# Patient Record
Sex: FEMALE | Race: WHITE | NOT HISPANIC OR LATINO | Employment: OTHER | URBAN - METROPOLITAN AREA
[De-identification: names, ages, dates, MRNs, and addresses within clinical notes are randomized per-mention and may not be internally consistent; named-entity substitution may affect disease eponyms.]

---

## 2017-03-06 DIAGNOSIS — E87.6 HYPOKALEMIA: ICD-10-CM

## 2017-03-06 RX ORDER — POTASSIUM CHLORIDE 750 MG/1
TABLET, FILM COATED, EXTENDED RELEASE ORAL
OUTPATIENT
Start: 2017-03-06

## 2017-03-06 RX ORDER — POTASSIUM CHLORIDE 20 MEQ/1
20 TABLET, EXTENDED RELEASE ORAL DAILY
Qty: 30 TAB | Refills: 0 | Status: SHIPPED | OUTPATIENT
Start: 2017-03-06 | End: 2017-11-20

## 2017-03-13 RX ORDER — ATENOLOL 50 MG/1
TABLET ORAL
Qty: 90 TAB | Refills: 0 | Status: SHIPPED | OUTPATIENT
Start: 2017-03-13 | End: 2017-06-11 | Stop reason: SDUPTHER

## 2017-03-20 RX ORDER — SPIRONOLACTONE 25 MG/1
TABLET ORAL
Qty: 90 TAB | Refills: 0 | Status: SHIPPED | OUTPATIENT
Start: 2017-03-20 | End: 2017-06-18 | Stop reason: SDUPTHER

## 2017-05-02 RX ORDER — HYDROCHLOROTHIAZIDE 25 MG/1
25 TABLET ORAL DAILY
Qty: 90 TAB | Refills: 1 | Status: SHIPPED | OUTPATIENT
Start: 2017-05-02 | End: 2017-10-07 | Stop reason: SDUPTHER

## 2017-06-08 RX ORDER — LISINOPRIL 10 MG/1
TABLET ORAL
Qty: 90 TAB | Refills: 1 | Status: SHIPPED | OUTPATIENT
Start: 2017-06-08 | End: 2017-11-20

## 2017-06-12 RX ORDER — ATENOLOL 50 MG/1
TABLET ORAL
Qty: 90 TAB | Refills: 1 | Status: SHIPPED | OUTPATIENT
Start: 2017-06-12 | End: 2017-12-08 | Stop reason: SDUPTHER

## 2017-06-19 RX ORDER — SPIRONOLACTONE 25 MG/1
TABLET ORAL
Qty: 90 TAB | Refills: 0 | Status: SHIPPED | OUTPATIENT
Start: 2017-06-19 | End: 2017-09-17 | Stop reason: SDUPTHER

## 2017-09-18 RX ORDER — SPIRONOLACTONE 25 MG/1
TABLET ORAL
Qty: 90 TAB | Refills: 0 | Status: SHIPPED | OUTPATIENT
Start: 2017-09-18 | End: 2017-11-20

## 2017-10-09 RX ORDER — HYDROCHLOROTHIAZIDE 25 MG/1
25 TABLET ORAL DAILY
Qty: 90 TAB | Refills: 1 | Status: SHIPPED | OUTPATIENT
Start: 2017-10-09 | End: 2018-04-09 | Stop reason: SDUPTHER

## 2017-11-02 ENCOUNTER — PATIENT OUTREACH (OUTPATIENT)
Dept: HEALTH INFORMATION MANAGEMENT | Facility: OTHER | Age: 82
End: 2017-11-02

## 2017-11-02 ENCOUNTER — TELEPHONE (OUTPATIENT)
Dept: HEALTH INFORMATION MANAGEMENT | Facility: OTHER | Age: 82
End: 2017-11-02

## 2017-11-02 DIAGNOSIS — Z12.11 SCREENING FOR COLON CANCER: Primary | ICD-10-CM

## 2017-11-02 NOTE — PROGRESS NOTES
Attempt #:1    WebIZ Checked & Epic Updated: Yes  · WebIZ Recommendations: FLU, PREVNAR (PCV13) , TDAP and ZOSTAVAX (Shingles)  · Is patient due for Tdap? YES. Patient was not notified of copay/out of pocket cost.  · Is patient due for Shingles? YES. Patient was not notified of copay/out of pocket cost.  HealthConnect Verified: yes  Verify PCP: yes    Communication Preference Obtained: yes     Review Care Team: yes    Annual Wellness Visit Scheduling  1. Scheduling Status:Scheduled        Care Gap Scheduling (Attempt to Schedule EACH Overdue Care Gap!)     Health Maintenance Due   Topic Date Due   • IMM DTaP/Tdap/Td Vaccine (1 - Tdap) 12[birth year]18/1953   • PAP SMEAR  12/18/1955   • COLONOSCOPY  12/18/1984   • IMM ZOSTER VACCINE  12/18/1994   • Annual Wellness Visit  10/23/2015   • MAMMOGRAM  12/02/2015   • IMM PNEUMOCOCCAL 65+ (ADULT) LOW/MEDIUM RISK SERIES (2 of 2 - PPSV23) 11/11/2016   • IMM INFLUENZA (1) 09/01/2017        Scheduled patient for Annual Wellness Visit will dicuss all other care gaps with pcp       MyChart Activation: already active  AgFlow Unique: no  Virtual Visits: no  Opt In to Text Messages: no

## 2017-11-14 ENCOUNTER — TELEPHONE (OUTPATIENT)
Dept: MEDICAL GROUP | Facility: MEDICAL CENTER | Age: 82
End: 2017-11-14

## 2017-11-14 NOTE — TELEPHONE ENCOUNTER
Future Appointments       Provider Department Center    11/20/2017 10:20 AM Radha Underwood M.D.; [facility] [address] [facility]          ANNUAL WELLNESS VISIT PRE-VISIT PLANNING     1.  Reviewed note from last office visit with PCP: YES Visit date: 10/04/16    2.  If any orders were placed at last visit, do we have Results/Consult Notes?        •  Labs - Labs ordered, NOT completed. Patient advised to complete prior to next appointment. REMIND PT TO COMPLETE FIT TEST       •  Imaging - Imaging ordered, completed and results are in chart. 10/11/16 DX SHOULDER       •  Referrals - No referrals were ordered at last office visit.     3.  Immunizations were updated in [facility] using WebIZ?: Yes       •  WebIZ Recommendations: FLU, PNEUMOVAX (PPSV23), TDAP and ZOSTAVAX (Shingles)       •  Is patient due for Tdap? YES. Patient was not notified of copay/out of pocket cost.       •  Is patient due for Shingles? YES. Patient was not notified of copay/out of pocket cost.     4.  Patient is due for the following Health Maintenance Topics:   Health Maintenance Due   Topic Date Due   •  Annual Wellness Visit  SCHEDULED FOR 11/20/17   • IMM DTaP/Tdap/Td Vaccine (1 - Tdap) NEEDS SCRIPT   • IMM ZOSTER VACCINE  NEEDS SCRIPT   • IMM PNEUMOVAX 23 11/11/2016   • IMM INFLUENZA (1) 09/01/2017

## 2017-11-20 ENCOUNTER — OFFICE VISIT (OUTPATIENT)
Dept: MEDICAL GROUP | Facility: MEDICAL CENTER | Age: 82
End: 2017-11-20
Payer: MEDICARE

## 2017-11-20 VITALS
HEIGHT: 63 IN | WEIGHT: 131 LBS | HEART RATE: 73 BPM | OXYGEN SATURATION: 95 % | SYSTOLIC BLOOD PRESSURE: 124 MMHG | TEMPERATURE: 98.5 F | BODY MASS INDEX: 23.21 KG/M2 | DIASTOLIC BLOOD PRESSURE: 68 MMHG

## 2017-11-20 DIAGNOSIS — Z23 NEEDS FLU SHOT: ICD-10-CM

## 2017-11-20 DIAGNOSIS — F32.0 MILD SINGLE CURRENT EPISODE OF MAJOR DEPRESSIVE DISORDER (HCC): ICD-10-CM

## 2017-11-20 DIAGNOSIS — Z12.11 COLON CANCER SCREENING: ICD-10-CM

## 2017-11-20 DIAGNOSIS — I10 ESSENTIAL HYPERTENSION: Chronic | ICD-10-CM

## 2017-11-20 DIAGNOSIS — Z00.00 MEDICARE ANNUAL WELLNESS VISIT, SUBSEQUENT: Primary | ICD-10-CM

## 2017-11-20 DIAGNOSIS — Z23 NEED FOR PNEUMOCOCCAL VACCINE: ICD-10-CM

## 2017-11-20 PROBLEM — F32.9 MAJOR DEPRESSIVE DISORDER: Status: ACTIVE | Noted: 2017-11-20

## 2017-11-20 PROCEDURE — 90662 IIV NO PRSV INCREASED AG IM: CPT | Performed by: FAMILY MEDICINE

## 2017-11-20 PROCEDURE — G0009 ADMIN PNEUMOCOCCAL VACCINE: HCPCS | Performed by: FAMILY MEDICINE

## 2017-11-20 PROCEDURE — G0008 ADMIN INFLUENZA VIRUS VAC: HCPCS | Performed by: FAMILY MEDICINE

## 2017-11-20 PROCEDURE — 90732 PPSV23 VACC 2 YRS+ SUBQ/IM: CPT | Performed by: FAMILY MEDICINE

## 2017-11-20 PROCEDURE — G0439 PPPS, SUBSEQ VISIT: HCPCS | Mod: 25 | Performed by: FAMILY MEDICINE

## 2017-11-20 ASSESSMENT — PATIENT HEALTH QUESTIONNAIRE - PHQ9
SUM OF ALL RESPONSES TO PHQ QUESTIONS 1-9: 10
CLINICAL INTERPRETATION OF PHQ2 SCORE: 3
5. POOR APPETITE OR OVEREATING: 3 - NEARLY EVERY DAY

## 2017-11-20 ASSESSMENT — PAIN SCALES - GENERAL: PAINLEVEL: 7=MODERATE-SEVERE PAIN

## 2017-11-20 NOTE — PROGRESS NOTES
Chief Complaint   Patient presents with   • Annual Wellness Visit         HPI:  Priscila is a 82 y.o. here for Medicare Annual Wellness Visit    Essential hypertension, BP has been adequately controlled on current medication. Denies headache, chest pain, and SOB.She has been  on HCTZ 25 mg daily, and Atenolol 50 mg daily. Denies side effects. .    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 However patient stated the she has been living with it, denies any suicidal ideation, refused a referral to behavioral health and antidepressant medication.    Due for the flu , and pneumonia shots, and colon cancer screening      Patient Active Problem List    Diagnosis Date Noted   • Chronic back pain 03/17/2016   • Primary osteoarthritis involving multiple joints 12/14/2015   • Family history of breast cancer in sister 11/25/2014   • Hypertension 10/22/2014       Current Outpatient Prescriptions   Medication Sig Dispense Refill   • Diphenhydramine-APAP, sleep, (TYLENOL PM EXTRA STRENGTH)  MG Tab Take  by mouth.     • hydrochlorothiazide (HYDRODIURIL) 25 MG Tab Take 1 Tab by mouth every day. 90 Tab 1   • atenolol (TENORMIN) 50 MG Tab TAKE 1 TABLET DAILY 90 Tab 1   • aspirin 81 MG tablet Take 81 mg by mouth every day.     • spironolactone (ALDACTONE) 25 MG Tab TAKE 1 TABLET DAILY (Patient not taking: Reported on 11/20/2017) 90 Tab 0   • lisinopril (PRINIVIL) 10 MG Tab TAKE 1 TABLET DAILY (Patient not taking: Reported on 11/20/2017) 90 Tab 1   • potassium chloride SA (KDUR) 20 MEQ Tab CR Take 1 Tab by mouth every day. 30 Tab 0   • KLOR-CON 10 10 MEQ tablet TAKE 1 TABLET DAILY 90 Tab 0   • methylPREDNISolone acetate (DEPO-MEDROL) 80 MG/ML Suspension 80 mg by Intramuscular route Once.       No current facility-administered medications for this visit.         Patient is taking medications as noted in medication  list.  Current supplements as per medication list.     Allergies: Patient has no known allergies.    Current social contact/activities: Lives with son, visits with family     Is patient current with immunizations? No, due for FLU, PNEUMOVAX (PPSV23), TDAP and ZOSTAVAX (Shingles). Patient is interested in receiving FLU and PNEUMOVAX (PPSV23) today.    She  reports that she quit smoking about 56 years ago. Her smoking use included Cigarettes. She has a 2.50 pack-year smoking history. She has never used smokeless tobacco. She reports that she drinks alcohol. She reports that she does not use drugs.  Counseling given: Not Answered        DPA/Advanced directive: Patient has Advanced Directive, but it is not on file. Instructed to bring in a copy to scan into their chart.    ROS:    Gait: Uses no assistive device   Ostomy: no   Other tubes: no   Amputations: no   Chronic oxygen use no   Last eye exam 1 year ago   Wears hearing aids: no   : Reports incontinence.       Screening:    DEPRESSION     Is patient seeing a counselor, psychiatrist or other healthcare provider regarding their mental health? No, but interested in referral. Referral pended.     Depression Screen (PHQ-2/PHQ-9) 11/11/2015 6/30/2016 11/20/2017   PHQ-2 Total Score 2 0 -   PHQ-2 Total Score - - 3   PHQ-9 Total Score - - 10       Interpretation of PHQ-9 Total Score   Score Severity   1-4 No Depression   5-9 Mild Depression   10-14 Moderate Depression   15-19 Moderately Severe Depression   20-27 Severe Depression      Depression Screening    Little interest or pleasure in doing things?  0 - not at all  Feeling down, depressed, or hopeless? 3 - nearly every day  Trouble falling or staying asleep, or sleeping too much?  2 - more than half the days  Feeling tired or having little energy?  1 - several days  Poor appetite or overeating?  3 - nearly every day  Feeling bad about yourself - or that you are a failure or have let yourself or your family down? 0 -  not at all  Trouble concentrating on things, such as reading the newspaper or watching television? 0 - not at all  Moving or speaking so slowly that other people could have noticed.  Or the opposite - being so fidgety or restless that you have been moving around a lot more than usual?  0 - not at all  Thoughts that you would be better off dead, or of hurting yourself?  1 - several days  Patient Health Questionnaire Score: 10      If depressive symptoms identified deferred to follow up visit unless specifically addressed in assessment and plan.    Interpretation of PHQ-9 Total Score   Score Severity   1-4 No Depression   5-9 Mild Depression   10-14 Moderate Depression   15-19 Moderately Severe Depression   20-27 Severe Depression      Screening for Cognitive Impairment    Three Minute Recall (apple, watch, megan)  1/3    Draw clock face with all 12 numbers set to the hand to show 10 minutes past 11 o'clock  1 5/5  If cognitive concerns identified, deferred for follow up unless specifically addressed in assessment and plan.    Fall Risk Assessment    Has the patient had two or more falls in the last year or any fall with injury in the last year?  No  If fall risk identified, deferred for follow up unless specifically addressed in assessment and plan.      Safety Assessment    Throw rugs on floor.  No  Handrails on all stairs.  Yes  Good lighting in all hallways.  Yes  Difficulty hearing.  Yes  Patient counseled about all safety risks that were identified.    Functional Assessment ADLs    Are there any barriers preventing you from cooking for yourself or meeting nutritional needs?  No.    Are there any barriers preventing you from driving safely or obtaining transportation?  No.    Are there any barriers preventing you from using a telephone or calling for help?  No.    Are there any barriers preventing you from shopping?  No.    Are there any barriers preventing you from taking care of your own finances?  No.    Are  there any barriers preventing you from managing your medications?  No.    Are you currently engaging any exercise or physical activity?  Yes.       Health Maintenance Summary                IMM DTaP/Tdap/Td Vaccine Overdue 12/18/1953     IMM ZOSTER VACCINE Overdue 12/18/1994     Annual Wellness Visit Overdue 10/23/2015      Done 10/22/2014     IMM PNEUMOCOCCAL 65+ (ADULT) LOW/MEDIUM RISK SERIES Overdue 11/11/2016      Done 11/11/2015 Imm Admin: Pneumococcal Conjugate Vaccine (Prevnar/PCV-13)    IMM INFLUENZA Overdue 9/1/2017      Done 11/11/2015 Imm Admin: Influenza Vaccine Quad Inj (Preserved)     Patient has more history with this topic...    BONE DENSITY Next Due 12/11/2019      Done 12/11/2014 DS-BONE DENSITY STUDY (DEXA)     Patient has more history with this topic...          Patient Care Team:  Radha Underwood M.D. as PCP - General (Geriatrics)  Mario Frederick M.D. as Consulting Physician (Cardiology)      Social History   Substance Use Topics   • Smoking status: Former Smoker     Packs/day: 0.50     Years: 5.00     Types: Cigarettes     Quit date: 6/4/1961   • Smokeless tobacco: Never Used      Comment: stated smoking at age 22   • Alcohol use Yes      Comment: occasional     Family History   Problem Relation Age of Onset   • Cancer Mother      uterine cancer   • Cancer Sister      breast cancer   • No Known Problems Father    • No Known Problems Maternal Grandmother    • No Known Problems Maternal Grandfather    • No Known Problems Paternal Grandmother    • No Known Problems Paternal Grandfather    • Genetic Brother      COPD     She  has a past medical history of Hypertension and Post-menopause (10/22/2014). She also has no past medical history of Asthma or Type II or unspecified type diabetes mellitus without mention of complication, not stated as uncontrolled.   Past Surgical History:   Procedure Laterality Date   • ABDOMINAL HYSTERECTOMY TOTAL     • OPEN REDUCTION      ankle x2         Exam:  "    Blood pressure 124/68, pulse 73, temperature 36.9 °C (98.5 °F), height 1.6 m (5' 3\"), weight 59.4 kg (131 lb), SpO2 95 %. Body mass index is 23.21 kg/m².    Hearing, fine.  Dentition , good.  Alert, oriented in no acute distress.  Eye contact is good, speech goal directed, affect calm      Assessment and Plan. The following treatment and monitoring plan is recommended:    82 y.o. female     1. Needs flu shot  Given today.    - INFLUENZA VACCINE, HIGH DOSE (65+ ONLY)  - Annual Wellness Visit - Includes PPPS Subsequent ()    2. Need for pneumococcal vaccine  Given today.    - PNEUMOCOCCAL POLYSACCHARIDE VACCINE 23-VALENT =>1YO SQ/IM; Future  - Annual Wellness Visit - Includes PPPS Subsequent ()    3. Essential hypertension  Has been adequately controlled on current medication. Denies headache, chest pain, and SOB.  Continue on HCTZ, and Atenolol.    - Annual Wellness Visit - Includes PPPS Subsequent ()    4. 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 However patient stated the she has been living with it, denies any suicidal ideation, refused a referral to behavioral health and antidepressant medication.    - Annual Wellness Visit - Includes PPPS Subsequent ()    5. Medicare annual wellness visit, subsequent  Preventive measures and chronic medical issues were reveiwed.    - Patient has been identified as being depressed and appropriate orders and counseling have been given  - Annual Wellness Visit - Includes PPPS Subsequent ()    6. Colon cancer screening    - Annual Wellness Visit - Includes PPPS Subsequent ()  - OCCULT BLOOD FECES IMMUNOASSAY; Future      Health Care Screening recommendations as per orders if indicated.  Referrals offered: PT/OT/Nutrition counseling/Behavioral Health/Smoking cessation as per orders if indicated.    Discussion today about general wellness and lifestyle habits:    · Prevent falls and reduce trip hazards; " Cautioned about securing or removing rugs.  · Have a working fire alarm and carbon monoxide detector;   · Engage in regular physical activity and social activities       Follow-up: 3 month.

## 2017-11-22 ENCOUNTER — HOSPITAL ENCOUNTER (OUTPATIENT)
Facility: MEDICAL CENTER | Age: 82
End: 2017-11-22
Attending: FAMILY MEDICINE
Payer: MEDICARE

## 2017-11-22 PROCEDURE — 82274 ASSAY TEST FOR BLOOD FECAL: CPT

## 2017-12-05 DIAGNOSIS — Z12.11 SCREENING FOR COLON CANCER: ICD-10-CM

## 2017-12-05 LAB — HEMOCCULT STL QL IA: NEGATIVE

## 2017-12-12 RX ORDER — ATENOLOL 50 MG/1
TABLET ORAL
Qty: 90 TAB | Refills: 1 | Status: SHIPPED | OUTPATIENT
Start: 2017-12-12 | End: 2018-05-25 | Stop reason: SDUPTHER

## 2018-01-11 ENCOUNTER — TELEPHONE (OUTPATIENT)
Dept: MEDICAL GROUP | Facility: MEDICAL CENTER | Age: 83
End: 2018-01-11

## 2018-01-11 DIAGNOSIS — I10 ESSENTIAL HYPERTENSION: Chronic | ICD-10-CM

## 2018-01-11 NOTE — TELEPHONE ENCOUNTER
Patient's daughter called stating she has concerns regarding Priscila. She has been only getting 34 minutes of sleep per night for quite some time. Also she is experiencing chest pain when she lays back. She states she informed you about this when she was seen on 11/20/17. She states the pain goes through her back, up her left arm and into her fingers. She states she take extra strength tylenol for the pain and waits 45 minutes for it to kick in. She also explained how she talked about this in detail with Xenia and may have forgotten to explain this to you. She would like you to order labs to have drawn for potassium and anything else you feel necessary. Please Advise, I iInformed the patient I would call her back by the end of day. Thank you.

## 2018-01-12 NOTE — TELEPHONE ENCOUNTER
Patient was called, she has appointment in 2/15, advised to make it earlier to discuss all her concerns. All blood work orders placed.

## 2018-01-13 ENCOUNTER — HOSPITAL ENCOUNTER (OUTPATIENT)
Dept: LAB | Facility: MEDICAL CENTER | Age: 83
End: 2018-01-13
Attending: FAMILY MEDICINE
Payer: MEDICARE

## 2018-01-13 DIAGNOSIS — I10 ESSENTIAL HYPERTENSION: Chronic | ICD-10-CM

## 2018-01-13 LAB
ALBUMIN SERPL BCP-MCNC: 4.4 G/DL (ref 3.2–4.9)
ALBUMIN/GLOB SERPL: 1.3 G/DL
ALP SERPL-CCNC: 66 U/L (ref 30–99)
ALT SERPL-CCNC: 15 U/L (ref 2–50)
ANION GAP SERPL CALC-SCNC: 10 MMOL/L (ref 0–11.9)
AST SERPL-CCNC: 18 U/L (ref 12–45)
BASOPHILS # BLD AUTO: 0.9 % (ref 0–1.8)
BASOPHILS # BLD: 0.06 K/UL (ref 0–0.12)
BILIRUB SERPL-MCNC: 0.6 MG/DL (ref 0.1–1.5)
BUN SERPL-MCNC: 19 MG/DL (ref 8–22)
CALCIUM SERPL-MCNC: 9.2 MG/DL (ref 8.5–10.5)
CHLORIDE SERPL-SCNC: 99 MMOL/L (ref 96–112)
CHOLEST SERPL-MCNC: 211 MG/DL (ref 100–199)
CO2 SERPL-SCNC: 29 MMOL/L (ref 20–33)
CREAT SERPL-MCNC: 0.65 MG/DL (ref 0.5–1.4)
EOSINOPHIL # BLD AUTO: 0.25 K/UL (ref 0–0.51)
EOSINOPHIL NFR BLD: 4 % (ref 0–6.9)
ERYTHROCYTE [DISTWIDTH] IN BLOOD BY AUTOMATED COUNT: 47 FL (ref 35.9–50)
GLOBULIN SER CALC-MCNC: 3.5 G/DL (ref 1.9–3.5)
GLUCOSE SERPL-MCNC: 101 MG/DL (ref 65–99)
HCT VFR BLD AUTO: 43.8 % (ref 37–47)
HDLC SERPL-MCNC: 55 MG/DL
HGB BLD-MCNC: 14.4 G/DL (ref 12–16)
IMM GRANULOCYTES # BLD AUTO: 0.01 K/UL (ref 0–0.11)
IMM GRANULOCYTES NFR BLD AUTO: 0.2 % (ref 0–0.9)
LDLC SERPL CALC-MCNC: 121 MG/DL
LYMPHOCYTES # BLD AUTO: 2.3 K/UL (ref 1–4.8)
LYMPHOCYTES NFR BLD: 36.4 % (ref 22–41)
MCH RBC QN AUTO: 30.6 PG (ref 27–33)
MCHC RBC AUTO-ENTMCNC: 32.9 G/DL (ref 33.6–35)
MCV RBC AUTO: 93 FL (ref 81.4–97.8)
MONOCYTES # BLD AUTO: 0.44 K/UL (ref 0–0.85)
MONOCYTES NFR BLD AUTO: 7 % (ref 0–13.4)
NEUTROPHILS # BLD AUTO: 3.26 K/UL (ref 2–7.15)
NEUTROPHILS NFR BLD: 51.5 % (ref 44–72)
NRBC # BLD AUTO: 0 K/UL
NRBC BLD-RTO: 0 /100 WBC
PLATELET # BLD AUTO: 224 K/UL (ref 164–446)
PMV BLD AUTO: 11.8 FL (ref 9–12.9)
POTASSIUM SERPL-SCNC: 3.4 MMOL/L (ref 3.6–5.5)
PROT SERPL-MCNC: 7.9 G/DL (ref 6–8.2)
RBC # BLD AUTO: 4.71 M/UL (ref 4.2–5.4)
SODIUM SERPL-SCNC: 138 MMOL/L (ref 135–145)
TRIGL SERPL-MCNC: 175 MG/DL (ref 0–149)
TSH SERPL DL<=0.005 MIU/L-ACNC: 1.83 UIU/ML (ref 0.38–5.33)
WBC # BLD AUTO: 6.3 K/UL (ref 4.8–10.8)

## 2018-01-13 PROCEDURE — 36415 COLL VENOUS BLD VENIPUNCTURE: CPT

## 2018-01-13 PROCEDURE — 80053 COMPREHEN METABOLIC PANEL: CPT

## 2018-01-13 PROCEDURE — 84443 ASSAY THYROID STIM HORMONE: CPT

## 2018-01-13 PROCEDURE — 80061 LIPID PANEL: CPT

## 2018-01-13 PROCEDURE — 85025 COMPLETE CBC W/AUTO DIFF WBC: CPT

## 2018-01-24 ENCOUNTER — PATIENT MESSAGE (OUTPATIENT)
Dept: MEDICAL GROUP | Facility: MEDICAL CENTER | Age: 83
End: 2018-01-24

## 2018-04-10 RX ORDER — HYDROCHLOROTHIAZIDE 25 MG/1
25 TABLET ORAL DAILY
Qty: 90 TAB | Refills: 1 | Status: SHIPPED | OUTPATIENT
Start: 2018-04-10 | End: 2018-04-11 | Stop reason: SDUPTHER

## 2018-04-11 RX ORDER — HYDROCHLOROTHIAZIDE 25 MG/1
25 TABLET ORAL DAILY
Qty: 90 TAB | Refills: 1 | Status: SHIPPED | OUTPATIENT
Start: 2018-04-11 | End: 2018-10-09 | Stop reason: SDUPTHER

## 2018-05-25 RX ORDER — ATENOLOL 50 MG/1
TABLET ORAL
Qty: 90 TAB | Refills: 3 | Status: ON HOLD | OUTPATIENT
Start: 2018-05-25 | End: 2020-11-22

## 2018-10-09 RX ORDER — HYDROCHLOROTHIAZIDE 25 MG/1
25 TABLET ORAL DAILY
Qty: 30 TAB | Refills: 0 | Status: SHIPPED | OUTPATIENT
Start: 2018-10-09 | End: 2018-10-11 | Stop reason: SDUPTHER

## 2018-10-11 RX ORDER — HYDROCHLOROTHIAZIDE 25 MG/1
25 TABLET ORAL DAILY
Qty: 30 TAB | Refills: 0 | Status: CANCELLED | OUTPATIENT
Start: 2018-10-11

## 2018-10-11 RX ORDER — HYDROCHLOROTHIAZIDE 25 MG/1
25 TABLET ORAL DAILY
Qty: 30 TAB | Refills: 0 | Status: SHIPPED | OUTPATIENT
Start: 2018-10-11 | End: 2020-12-19

## 2020-11-19 ENCOUNTER — APPOINTMENT (OUTPATIENT)
Dept: RADIOLOGY | Facility: MEDICAL CENTER | Age: 85
DRG: 024 | End: 2020-11-19
Attending: RADIOLOGY
Payer: MEDICARE

## 2020-11-19 ENCOUNTER — HOSPITAL ENCOUNTER (OUTPATIENT)
Dept: RADIOLOGY | Facility: MEDICAL CENTER | Age: 85
End: 2020-11-19
Payer: MEDICARE

## 2020-11-19 ENCOUNTER — HOSPITAL ENCOUNTER (INPATIENT)
Facility: MEDICAL CENTER | Age: 85
LOS: 3 days | DRG: 024 | End: 2020-11-22
Attending: EMERGENCY MEDICINE | Admitting: HOSPITALIST
Payer: MEDICARE

## 2020-11-19 DIAGNOSIS — I63.9 CEREBROVASCULAR ACCIDENT (CVA), UNSPECIFIED MECHANISM (HCC): ICD-10-CM
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
## 2020-11-19 PROBLEM — R07.9 CHEST PAIN: Status: ACTIVE | Noted: 2020-11-19

## 2020-11-19 LAB
ABO + RH BLD: NORMAL
ABO GROUP BLD: NORMAL
ALBUMIN SERPL BCP-MCNC: 4.1 G/DL (ref 3.2–4.9)
ALBUMIN/GLOB SERPL: 1.3 G/DL
ALP SERPL-CCNC: 102 U/L (ref 30–99)
ALT SERPL-CCNC: 48 U/L (ref 2–50)
ANION GAP SERPL CALC-SCNC: 15 MMOL/L (ref 7–16)
APTT PPP: 35.1 SEC (ref 24.7–36)
AST SERPL-CCNC: 54 U/L (ref 12–45)
BASOPHILS # BLD AUTO: 0.3 % (ref 0–1.8)
BASOPHILS # BLD: 0.05 K/UL (ref 0–0.12)
BILIRUB SERPL-MCNC: 0.5 MG/DL (ref 0.1–1.5)
BLD GP AB SCN SERPL QL: NORMAL
BUN SERPL-MCNC: 20 MG/DL (ref 8–22)
CALCIUM SERPL-MCNC: 9.6 MG/DL (ref 8.5–10.5)
CHLORIDE SERPL-SCNC: 100 MMOL/L (ref 96–112)
CO2 SERPL-SCNC: 24 MMOL/L (ref 20–33)
COVID ORDER STATUS COVID19: NORMAL
CREAT SERPL-MCNC: 0.66 MG/DL (ref 0.5–1.4)
EKG IMPRESSION: NORMAL
EOSINOPHIL # BLD AUTO: 0.09 K/UL (ref 0–0.51)
EOSINOPHIL NFR BLD: 0.6 % (ref 0–6.9)
ERYTHROCYTE [DISTWIDTH] IN BLOOD BY AUTOMATED COUNT: 51.5 FL (ref 35.9–50)
GLOBULIN SER CALC-MCNC: 3.1 G/DL (ref 1.9–3.5)
GLUCOSE SERPL-MCNC: 137 MG/DL (ref 65–99)
HCT VFR BLD AUTO: 43.2 % (ref 37–47)
HGB BLD-MCNC: 14.2 G/DL (ref 12–16)
IMM GRANULOCYTES # BLD AUTO: 0.08 K/UL (ref 0–0.11)
IMM GRANULOCYTES NFR BLD AUTO: 0.5 % (ref 0–0.9)
INR PPP: 1.08 (ref 0.87–1.13)
LYMPHOCYTES # BLD AUTO: 1.48 K/UL (ref 1–4.8)
LYMPHOCYTES NFR BLD: 10 % (ref 22–41)
MAGNESIUM SERPL-MCNC: 1.5 MG/DL (ref 1.5–2.5)
MCH RBC QN AUTO: 31.9 PG (ref 27–33)
MCHC RBC AUTO-ENTMCNC: 32.9 G/DL (ref 33.6–35)
MCV RBC AUTO: 97.1 FL (ref 81.4–97.8)
MONOCYTES # BLD AUTO: 0.52 K/UL (ref 0–0.85)
MONOCYTES NFR BLD AUTO: 3.5 % (ref 0–13.4)
NEUTROPHILS # BLD AUTO: 12.52 K/UL (ref 2–7.15)
NEUTROPHILS NFR BLD: 85.1 % (ref 44–72)
NRBC # BLD AUTO: 0 K/UL
NRBC BLD-RTO: 0 /100 WBC
PLATELET # BLD AUTO: 174 K/UL (ref 164–446)
PMV BLD AUTO: 11.9 FL (ref 9–12.9)
POTASSIUM SERPL-SCNC: 3 MMOL/L (ref 3.6–5.5)
PROT SERPL-MCNC: 7.2 G/DL (ref 6–8.2)
PROTHROMBIN TIME: 14.4 SEC (ref 12–14.6)
RBC # BLD AUTO: 4.45 M/UL (ref 4.2–5.4)
RH BLD: NORMAL
SARS-COV+SARS-COV-2 AG RESP QL IA.RAPID: NOTDETECTED
SODIUM SERPL-SCNC: 139 MMOL/L (ref 135–145)
SPECIMEN SOURCE: NORMAL
TROPONIN T SERPL-MCNC: 14 NG/L (ref 6–19)
TROPONIN T SERPL-MCNC: 18 NG/L (ref 6–19)
WBC # BLD AUTO: 14.7 K/UL (ref 4.8–10.8)

## 2020-11-19 PROCEDURE — 700101 HCHG RX REV CODE 250: Performed by: HOSPITALIST

## 2020-11-19 PROCEDURE — 93010 ELECTROCARDIOGRAM REPORT: CPT | Performed by: INTERNAL MEDICINE

## 2020-11-19 PROCEDURE — 03CG3ZZ EXTIRPATION OF MATTER FROM INTRACRANIAL ARTERY, PERCUTANEOUS APPROACH: ICD-10-PCS | Performed by: RADIOLOGY

## 2020-11-19 PROCEDURE — 99291 CRITICAL CARE FIRST HOUR: CPT

## 2020-11-19 PROCEDURE — 99223 1ST HOSP IP/OBS HIGH 75: CPT | Performed by: PSYCHIATRY & NEUROLOGY

## 2020-11-19 PROCEDURE — 86850 RBC ANTIBODY SCREEN: CPT

## 2020-11-19 PROCEDURE — 85610 PROTHROMBIN TIME: CPT

## 2020-11-19 PROCEDURE — 86900 BLOOD TYPING SEROLOGIC ABO: CPT

## 2020-11-19 PROCEDURE — 93005 ELECTROCARDIOGRAM TRACING: CPT | Performed by: INTERNAL MEDICINE

## 2020-11-19 PROCEDURE — 770022 HCHG ROOM/CARE - ICU (200)

## 2020-11-19 PROCEDURE — 84484 ASSAY OF TROPONIN QUANT: CPT

## 2020-11-19 PROCEDURE — 85025 COMPLETE CBC W/AUTO DIFF WBC: CPT

## 2020-11-19 PROCEDURE — 700111 HCHG RX REV CODE 636 W/ 250 OVERRIDE (IP): Performed by: INTERNAL MEDICINE

## 2020-11-19 PROCEDURE — 85730 THROMBOPLASTIN TIME PARTIAL: CPT

## 2020-11-19 PROCEDURE — 99291 CRITICAL CARE FIRST HOUR: CPT | Performed by: INTERNAL MEDICINE

## 2020-11-19 PROCEDURE — 86901 BLOOD TYPING SEROLOGIC RH(D): CPT

## 2020-11-19 PROCEDURE — 83735 ASSAY OF MAGNESIUM: CPT

## 2020-11-19 PROCEDURE — 80053 COMPREHEN METABOLIC PANEL: CPT

## 2020-11-19 PROCEDURE — 37184 PRIM ART M-THRMBC 1ST VSL: CPT

## 2020-11-19 PROCEDURE — 700117 HCHG RX CONTRAST REV CODE 255: Performed by: RADIOLOGY

## 2020-11-19 PROCEDURE — 99223 1ST HOSP IP/OBS HIGH 75: CPT | Mod: AI | Performed by: HOSPITALIST

## 2020-11-19 PROCEDURE — 87426 SARSCOV CORONAVIRUS AG IA: CPT

## 2020-11-19 RX ORDER — POLYETHYLENE GLYCOL 3350 17 G/17G
1 POWDER, FOR SOLUTION ORAL
Status: CANCELLED | OUTPATIENT
Start: 2020-11-19

## 2020-11-19 RX ORDER — ONDANSETRON 2 MG/ML
4 INJECTION INTRAMUSCULAR; INTRAVENOUS EVERY 4 HOURS PRN
Status: DISCONTINUED | OUTPATIENT
Start: 2020-11-19 | End: 2020-11-22 | Stop reason: HOSPADM

## 2020-11-19 RX ORDER — ONDANSETRON 2 MG/ML
4 INJECTION INTRAMUSCULAR; INTRAVENOUS EVERY 4 HOURS PRN
Status: CANCELLED | OUTPATIENT
Start: 2020-11-19

## 2020-11-19 RX ORDER — ASPIRIN 300 MG/1
300 SUPPOSITORY RECTAL DAILY
Status: DISCONTINUED | OUTPATIENT
Start: 2020-11-20 | End: 2020-11-22 | Stop reason: HOSPADM

## 2020-11-19 RX ORDER — ASPIRIN 325 MG
325 TABLET ORAL DAILY
Status: CANCELLED | OUTPATIENT
Start: 2020-11-20

## 2020-11-19 RX ORDER — POLYETHYLENE GLYCOL 3350 17 G/17G
1 POWDER, FOR SOLUTION ORAL
Status: DISCONTINUED | OUTPATIENT
Start: 2020-11-19 | End: 2020-11-22 | Stop reason: HOSPADM

## 2020-11-19 RX ORDER — SODIUM CHLORIDE 9 MG/ML
INJECTION, SOLUTION INTRAVENOUS CONTINUOUS
Status: CANCELLED | OUTPATIENT
Start: 2020-11-19 | End: 2020-11-20

## 2020-11-19 RX ORDER — ONDANSETRON 4 MG/1
4 TABLET, ORALLY DISINTEGRATING ORAL EVERY 4 HOURS PRN
Status: CANCELLED | OUTPATIENT
Start: 2020-11-19

## 2020-11-19 RX ORDER — AMOXICILLIN 250 MG
2 CAPSULE ORAL 2 TIMES DAILY
Status: CANCELLED | OUTPATIENT
Start: 2020-11-19

## 2020-11-19 RX ORDER — ONDANSETRON 4 MG/1
4 TABLET, ORALLY DISINTEGRATING ORAL EVERY 4 HOURS PRN
Status: DISCONTINUED | OUTPATIENT
Start: 2020-11-19 | End: 2020-11-22 | Stop reason: HOSPADM

## 2020-11-19 RX ORDER — ASPIRIN 300 MG/1
300 SUPPOSITORY RECTAL DAILY
Status: CANCELLED | OUTPATIENT
Start: 2020-11-20

## 2020-11-19 RX ORDER — ASPIRIN 81 MG/1
324 TABLET, CHEWABLE ORAL DAILY
Status: CANCELLED | OUTPATIENT
Start: 2020-11-20

## 2020-11-19 RX ORDER — BISACODYL 10 MG
10 SUPPOSITORY, RECTAL RECTAL
Status: CANCELLED | OUTPATIENT
Start: 2020-11-19

## 2020-11-19 RX ORDER — ASPIRIN 325 MG
325 TABLET ORAL DAILY
Status: DISCONTINUED | OUTPATIENT
Start: 2020-11-20 | End: 2020-11-22 | Stop reason: HOSPADM

## 2020-11-19 RX ORDER — BISACODYL 10 MG
10 SUPPOSITORY, RECTAL RECTAL
Status: DISCONTINUED | OUTPATIENT
Start: 2020-11-19 | End: 2020-11-22 | Stop reason: HOSPADM

## 2020-11-19 RX ORDER — AMOXICILLIN 250 MG
2 CAPSULE ORAL 2 TIMES DAILY
Status: DISCONTINUED | OUTPATIENT
Start: 2020-11-19 | End: 2020-11-22 | Stop reason: HOSPADM

## 2020-11-19 RX ORDER — POTASSIUM CHLORIDE 7.45 MG/ML
10 INJECTION INTRAVENOUS
Status: DISPENSED | OUTPATIENT
Start: 2020-11-19 | End: 2020-11-19

## 2020-11-19 RX ORDER — ACETAMINOPHEN 325 MG/1
650 TABLET ORAL EVERY 6 HOURS PRN
Status: DISCONTINUED | OUTPATIENT
Start: 2020-11-19 | End: 2020-11-22 | Stop reason: HOSPADM

## 2020-11-19 RX ORDER — LABETALOL HYDROCHLORIDE 5 MG/ML
10 INJECTION, SOLUTION INTRAVENOUS
Status: DISCONTINUED | OUTPATIENT
Start: 2020-11-19 | End: 2020-11-22 | Stop reason: HOSPADM

## 2020-11-19 RX ORDER — ATORVASTATIN CALCIUM 40 MG/1
40 TABLET, FILM COATED ORAL EVERY EVENING
Status: DISCONTINUED | OUTPATIENT
Start: 2020-11-19 | End: 2020-11-22 | Stop reason: HOSPADM

## 2020-11-19 RX ORDER — ASPIRIN 81 MG/1
324 TABLET, CHEWABLE ORAL DAILY
Status: DISCONTINUED | OUTPATIENT
Start: 2020-11-20 | End: 2020-11-22 | Stop reason: HOSPADM

## 2020-11-19 RX ORDER — ACETAMINOPHEN 325 MG/1
650 TABLET ORAL EVERY 6 HOURS PRN
Status: CANCELLED | OUTPATIENT
Start: 2020-11-19

## 2020-11-19 RX ADMIN — POTASSIUM CHLORIDE 10 MEQ: 7.46 INJECTION, SOLUTION INTRAVENOUS at 16:25

## 2020-11-19 RX ADMIN — IOHEXOL 40 ML: 300 INJECTION, SOLUTION INTRAVENOUS at 15:07

## 2020-11-19 RX ADMIN — POTASSIUM CHLORIDE 10 MEQ: 7.46 INJECTION, SOLUTION INTRAVENOUS at 17:54

## 2020-11-19 RX ADMIN — LABETALOL HYDROCHLORIDE 10 MG: 5 INJECTION, SOLUTION INTRAVENOUS at 16:47

## 2020-11-19 RX ADMIN — POTASSIUM CHLORIDE 10 MEQ: 7.46 INJECTION, SOLUTION INTRAVENOUS at 19:35

## 2020-11-19 ASSESSMENT — ENCOUNTER SYMPTOMS
WEAKNESS: 1
SHORTNESS OF BREATH: 0
SPEECH CHANGE: 0
NERVOUS/ANXIOUS: 0
COUGH: 0
NAUSEA: 0
BACK PAIN: 0
VOMITING: 0
ABDOMINAL PAIN: 0
SENSORY CHANGE: 0
HEADACHES: 1
PALPITATIONS: 0
FALLS: 1
HEADACHES: 0
DIARRHEA: 0
FEVER: 0
CHILLS: 0
STRIDOR: 0
DIZZINESS: 0
FOCAL WEAKNESS: 1
EYE DISCHARGE: 0
BLURRED VISION: 0

## 2020-11-19 ASSESSMENT — FIBROSIS 4 INDEX: FIB4 SCORE: 3.81

## 2020-11-19 NOTE — ED TRIAGE NOTES
Pt presents to ED w/ stroke symptoms.    1402>ER Door time. ERP and Neuro APN @ BS.     1000> Last known well.   12noon> Fall. Found down by son. No deficits initially, but son was worried. EMS to scene. Pt had witnessed onset of symptoms at 1205, left sided facial droop and left arm and left leg went flaccid. Pt was then taken to [facility].  1234> TPA administered. Initial NIH 6.   ERP NIH 8 on arrival here.    1418> Pt to IR w/ RN on monitor.    PTA bilat IV's. .

## 2020-11-19 NOTE — CONSULTS
Critical Care Consultation    Date of consult: 11/19/2020    Referring Physician  Oscar Reaves M.D.    Reason for Consultation  CVA    History of Presenting Illness  85 y.o. female who presented 11/19/2020 with h/o HTN who is BIBA after a GLF.  Apparently there were no deficits initially, but EMS noted new L side weakness and facial droop on their evaluation. She was initially treated at [facility], found to have aR MCA M1 LVO, received TPA then was transferred to [facility] for thrombectomy. Apparently NIHSS have been in the 6-8 range.    On my evaluation Priscila is alert and interactive.  She reports she started having some chest pains last weekend so saw her PCP then cardiologist today.  She is unsure if anyone had ordered a stress test.  She is not having chest pain at this time.  Other than that she complains of a right temporal headache.    Code Status  No Order    Review of Systems  Review of Systems   Eyes: Negative for blurred vision.   Respiratory: Negative for shortness of breath.    Cardiovascular: Positive for chest pain (recent chest pains so saw her cardiologist).   Neurological: Positive for weakness and headaches.       Past Medical History   has a past medical history of Hypertension and Post-menopause (10/22/2014). She also has no past medical history of Asthma or Type II or unspecified type diabetes mellitus without mention of complication, not stated as uncontrolled.    Surgical History   has a past surgical history that includes abdominal hysterectomy total and open reduction.    Family History  family history includes Cancer in her mother and sister; Genetic Disorder in her brother; No Known Problems in her father, maternal grandfather, maternal grandmother, paternal grandfather, and paternal grandmother.    Social History   reports that she quit smoking about 59 years ago. Her smoking use included cigarettes. She has a 2.50 pack-year smoking history. She has never used smokeless tobacco.  She reports current alcohol use. She reports that she does not use drugs.    Medications  Home Medications    **Home medications have not yet been reviewed for this encounter**       No current facility-administered medications for this encounter.      Current Outpatient Medications   Medication Sig Dispense Refill   • hydroCHLOROthiazide (HYDRODIURIL) 25 MG Tab Take 1 Tab by mouth every day. 30 Tab 0   • atenolol (TENORMIN) 50 MG Tab TAKE 1 TABLET DAILY 90 Tab 3   • Diphenhydramine-APAP, sleep, (TYLENOL PM EXTRA STRENGTH)  MG Tab Take  by mouth.     • aspirin 81 MG tablet Take 81 mg by mouth every day.         Allergies  No Known Allergies    Vital Signs last 24 hours  Pulse:  [87-90] 88  Resp:  [20-24] 24  BP: (118-174)/(66-89) 155/82  SpO2:  [93 %-97 %] 97 %    Physical Exam  Physical Exam  Constitutional:       Appearance: Normal appearance.   HENT:      Head: Normocephalic.      Comments: Left shin and left orbital ecchymosis     Mouth/Throat:      Mouth: Mucous membranes are moist.   Eyes:      Pupils: Pupils are equal, round, and reactive to light.   Neck:      Musculoskeletal: Normal range of motion.   Cardiovascular:      Rate and Rhythm: Normal rate and regular rhythm.      Pulses: Normal pulses.      Heart sounds: Normal heart sounds.   Pulmonary:      Effort: Pulmonary effort is normal.      Breath sounds: Normal breath sounds.   Abdominal:      General: Abdomen is flat. Bowel sounds are normal.      Palpations: Abdomen is soft.      Tenderness: There is no abdominal tenderness.   Musculoskeletal:      Right lower leg: No edema.      Left lower leg: No edema.   Skin:     General: Skin is warm and dry.   Neurological:      Mental Status: She is alert.      Comments: Left facial droop  Left arm is 4+ out of 5  Otherwise cranial nerves, strength and sensation are intact   Psychiatric:         Mood and Affect: Mood normal.         Fluids  No intake or output data in the 24 hours ending 11/19/20  1442    Laboratory  Recent Results (from the past 48 hour(s))   CBC WITH DIFFERENTIAL    Collection Time: 11/19/20  2:18 PM   Result Value Ref Range    WBC 14.7 (H) 4.8 - 10.8 K/uL    RBC 4.45 4.20 - 5.40 M/uL    Hemoglobin 14.2 12.0 - 16.0 g/dL    Hematocrit 43.2 37.0 - 47.0 %    MCV 97.1 81.4 - 97.8 fL    MCH 31.9 27.0 - 33.0 pg    MCHC 32.9 (L) 33.6 - 35.0 g/dL    RDW 51.5 (H) 35.9 - 50.0 fL    Platelet Count 174 164 - 446 K/uL    MPV 11.9 9.0 - 12.9 fL    Neutrophils-Polys 85.10 (H) 44.00 - 72.00 %    Lymphocytes 10.00 (L) 22.00 - 41.00 %    Monocytes 3.50 0.00 - 13.40 %    Eosinophils 0.60 0.00 - 6.90 %    Basophils 0.30 0.00 - 1.80 %    Immature Granulocytes 0.50 0.00 - 0.90 %    Nucleated RBC 0.00 /100 WBC    Neutrophils (Absolute) 12.52 (H) 2.00 - 7.15 K/uL    Lymphs (Absolute) 1.48 1.00 - 4.80 K/uL    Monos (Absolute) 0.52 0.00 - 0.85 K/uL    Eos (Absolute) 0.09 0.00 - 0.51 K/uL    Baso (Absolute) 0.05 0.00 - 0.12 K/uL    Immature Granulocytes (abs) 0.08 0.00 - 0.11 K/uL    NRBC (Absolute) 0.00 K/uL       Imaging  CT-FOREIGN FILM CAT SCAN   Final Result      IR-THROMBO MECHANICAL ARTERY,INIT    (Results Pending)   EC-ECHOCARDIOGRAM COMPLETE W/O CONT    (Results Pending)       Assessment/Plan  * CVA (cerebral vascular accident) (HCC)  Assessment & Plan  R MCA M1 LVO  S/p TPA and then thrombectomy 11/19  Neuro consulting  Goal BP: sbp <160, labetolol prn and nicardipine if need needed  PT/OT/SLP  TTE  CT 24 hours post TPA  ASA post-CT tomorrow  Check lipids, A1C  Statin when able to take PO or enteral access is available      Chest pain  Assessment & Plan  Saw her cardiologist for chest pains today  EKG, and trend troponins now  Monitor for additional chest pain, and consider stress test during this admit vs outpatient    Hypokalemia  Assessment & Plan  Replete >4    Essential hypertension  Assessment & Plan  Will use IV prn meds until enteral access or able to take PO      Discussed patient condition and  risk of morbidity and/or mortality with RN, Patient and neurology.    The patient remains critically ill with acute CVA post-TPA at high risk for neurologic decomensation and death.  Requires q1h neuro check.  Critical care time = 45 minutes in directly providing and coordinating critical care and extensive data review.  No time overlap and excludes procedures.

## 2020-11-19 NOTE — ED PROVIDER NOTES
ED Provider Note    ER PROVIDER NOTE    CHIEF COMPLAINT  No chief complaint on file.      HPI  Priscila Flanagan is a 85 y.o. female who presents to the emergency department complaining of left-sided weakness.  Patient was in her normal state of health this morning, she went to a cardiology appointment she was feeling weak after this and did have a fall around 10 am although no apparent neurologic deficits.  EMS was called and while they were evaluating her at 1205 noticed new left sided arm weakness and facial droop.  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  Other than the weakness and numbness to her left side, patient reports no other symptoms.  No headache, no chest pain, no fevers chills cough or congestion.  No nausea or vomiting or abdominal pain    REVIEW OF SYSTEMS  Pertinent positives include left-sided weakness. Pertinent negatives include no headache. See HPI for details. All other systems reviewed and are negative.    PAST MEDICAL HISTORY   has a past medical history of Hypertension and Post-menopause (10/22/2014).    SURGICAL HISTORY   has a past surgical history that includes abdominal hysterectomy total and open reduction.    FAMILY HISTORY  Family History   Problem Relation Age of Onset   • Cancer Mother         uterine cancer   • Cancer Sister         breast cancer   • No Known Problems Father    • No Known Problems Maternal Grandmother    • No Known Problems Maternal Grandfather    • No Known Problems Paternal Grandmother    • No Known Problems Paternal Grandfather    • Genetic Disorder Brother         COPD       SOCIAL HISTORY  Social History     Socioeconomic History   • Marital status:      Spouse name: Not on file   • Number of children: Not on file   • Years of education: Not on file   • Highest education level: Not on file   Occupational  History   • Not on file   Social Needs   • Financial resource strain: Not on file   • Food insecurity     Worry: Not on file     Inability: Not on file   • Transportation needs     Medical: Not on file     Non-medical: Not on file   Tobacco Use   • Smoking status: Former Smoker     Packs/day: 0.50     Years: 5.00     Pack years: 2.50     Types: Cigarettes     Quit date: 1961     Years since quittin.5   • Smokeless tobacco: Never Used   • Tobacco comment: stated smoking at age 22   Substance and Sexual Activity   • Alcohol use: Yes     Comment: occasional   • Drug use: No   • Sexual activity: Yes     Partners: Male     Comment:  / lives with her son   Lifestyle   • Physical activity     Days per week: Not on file     Minutes per session: Not on file   • Stress: Not on file   Relationships   • Social connections     Talks on phone: Not on file     Gets together: Not on file     Attends Christian service: Not on file     Active member of club or organization: Not on file     Attends meetings of clubs or organizations: Not on file     Relationship status: Not on file   • Intimate partner violence     Fear of current or ex partner: Not on file     Emotionally abused: Not on file     Physically abused: Not on file     Forced sexual activity: Not on file   Other Topics Concern   • Not on file   Social History Narrative   • Not on file      Social History     Substance and Sexual Activity   Drug Use No       CURRENT MEDICATIONS  Home Medications    **Home medications have not yet been reviewed for this encounter**         ALLERGIES  No Known Allergies    PHYSICAL EXAM  VITAL SIGNS: /65   Pulse 84   Resp (!) 21   Wt 59.4 kg (130 lb 15.3 oz)   SpO2 96%   BMI 23.20 kg/m²   Pulse ox interpretation:I interpret this pulse ox as normal.    Constitutional: Alert in no apparent distress.  HENT: Small hematoma to the left temple, Bilateral external ears normal, Nose normal.   Eyes: Pupils are equal and  reactive, Conjunctiva normal, Non-icteric.   Neck: Normal range of motion, No tenderness, Supple, No stridor.   Lymphatic: No lymphadenopathy noted.   Cardiovascular: Regular rate and rhythm, no murmurs.   Thorax & Lungs: Normal breath sounds, No respiratory distress, No wheezing, No chest tenderness.   Abdomen: Bowel sounds normal, Soft, No tenderness, No masses, No pulsatile masses. No peritoneal signs.  Skin: Warm, Dry, No erythema, No rash.   Back: No bony tenderness, No CVA tenderness.   Extremities: Intact distal pulses, No edema, No tenderness, No cyanosis, Negative Brit's sign.  Musculoskeletal: Good range of motion in all major joints. No tenderness to palpation or major deformities noted.   Neurologic: Alert, oriented to person place time and events, there is a left-sided facial droop, no visual gaze palsy, appears to have a visual field deficit, L neglect no drift with right arm, patient is slightly able to move her left arm against gravity, no drift with right leg, is slightly able to move her left leg against gravity, no dysmetria, decreased sensation to light touch with the left upper and lower extremity  Psychiatric: Affect normal, Judgment normal, Mood normal.     NIH 8    DIAGNOSTIC STUDIES / PROCEDURES    LABS  Labs Reviewed   CBC WITH DIFFERENTIAL - Abnormal; Notable for the following components:       Result Value    WBC 14.7 (*)     MCHC 32.9 (*)     RDW 51.5 (*)     Neutrophils-Polys 85.10 (*)     Lymphocytes 10.00 (*)     Neutrophils (Absolute) 12.52 (*)     All other components within normal limits    Narrative:     Indicate which anticoagulants the patient is on:->UNKNOWN   COMP METABOLIC PANEL - Abnormal; Notable for the following components:    Potassium 3.0 (*)     Glucose 137 (*)     AST(SGOT) 54 (*)     Alkaline Phosphatase 102 (*)     All other components within normal limits    Narrative:     Indicate which anticoagulants the patient is on:->UNKNOWN   COVID/SARS COV-2    Narrative:      Is patient being admitted?->Yes  Does this patient meet criteria for Rush/Cepheid per [facility]  Inpatient Workflow? (See workflow link below)->Yes  Expected turn around time?->(Christine/Abbott) Antigen dry swab  test asymptomatic surgical patient  Is this the patients First SARS CoV-2 test?->Unknown  Is this patient employed in healthcare?->No  Is the patient symptomatic as defined by the CDC?->No  Is the patient hospitalized?->No  Is the patient a resident in a congregate care  setting?->Unknown  Is the patient pregnant?->No  Have you been in close contact with a person who is suspected  or known to be positive for COVID-19 within the last 30 days  (e.g. last seen that person < 30 days ago)->Unknown   TROPONIN    Narrative:     Indicate which anticoagulants the patient is on:->UNKNOWN   PROTHROMBIN TIME    Narrative:     Indicate which anticoagulants the patient is on:->UNKNOWN   APTT    Narrative:     Indicate which anticoagulants the patient is on:->UNKNOWN   COD (ADULT)   SARS-COV ANTIGEN JASIEL    Narrative:     Is patient being admitted?->Yes  Does this patient meet criteria for Rush/Cepheid per [facility]  Inpatient Workflow? (See workflow link below)->Yes  Expected turn around time?->(Christine/Abbott) Antigen dry swab  test asymptomatic surgical patient  Is this the patients First SARS CoV-2 test?->Unknown  Is this patient employed in healthcare?->No  Is the patient symptomatic as defined by the CDC?->No  Is the patient hospitalized?->No  Is the patient a resident in a congregate care  setting?->Unknown  Is the patient pregnant?->No  Have you been in close contact with a person who is suspected  or known to be positive for COVID-19 within the last 30 days  (e.g. last seen that person < 30 days ago)->Unknown   ESTIMATED GFR    Narrative:     Indicate which anticoagulants the patient is on:->UNKNOWN   COVID/SARS COV-2   HEMOGLOBIN A1C         RADIOLOGY  CT-FOREIGN FILM CAT SCAN   Final Result      IR-THROMBO MECHANICAL  ARTERY,INIT    (Results Pending)   EC-ECHOCARDIOGRAM COMPLETE W/O CONT    (Results Pending)     The radiologist's interpretation of all radiological studies have been reviewed and images independently viewed by me.    COURSE & MEDICAL DECISION MAKING  Nursing notes, VS, PMSFHx reviewed in chart.    2:05 PM Patient seen and examined at bedside. Patient will be treated with IR thrombectomy ordered for additional labs, IR to evaluate her symptoms.     Reviewed records from outside facility demonstrating M1 occlusion and alteplase given    2:15 PM  Patient taken expeditiously to IR    I discussed the case with Dr. Jones from the intensivist team as well as Dr. Wall from hospitalist for admission, neurology was consulted on patient arrival, Dr. Vitale        Decision Making:  This is a 85 y.o. female presented with left-sided weakness.  She was found to have an M1 occlusion and taken for IR thrombectomy.  She received alteplase at outside facility.  She has had no chest pain to suggest dissection as etiology for symptoms, no recent infectious symptoms either.  Blood pressure was within appropriate parameters status post alteplase    Patient is admitted in critical condition    FINAL IMPRESSION  1. Cerebrovascular accident (CVA), unspecified mechanism (HCC)    2. Fall, initial encounter         The note accurately reflects work and decisions made by me.  Oscar Reaves M.D.  11/19/2020  3:07 PM

## 2020-11-19 NOTE — PROGRESS NOTES
IR Nursing Note:    Cerebral angiogram with mechanical thrombectomy by Dr. Melton assisted by RT Michael, right groin (femoral) access site; Pre-procedure pedal pulses 1+, suction system was used to retrieve clot.   ?  Patient tolerated procedure, Vital signs were taken every 5 minutes and remained within parameters (see doc flow sheets) ;hemostasis achieved using TERUMO AngioSeal Vascular Closure Device deployed at 1457; report given to PATEL Alegria; patient transported to ICU with RN and monitor      TERUMO AngioSeal Vascular Closure Device 8F REF# [id number] LOT# [id number] EXP. 08/31/2021  NO sedation administered    TICI score 2c  Procedure stop time- 1458     Post procedure pedal pulses 1+

## 2020-11-19 NOTE — CONSULTS
"Neurology STROKE CODE H&P  Neurohospitalist Service, [facility]    Referring Physician: Oscar Reaves M.D.    STROKE CODE: acute onset R MCA syndrome    To obtain the most accurate data regarding the time called, and time patient seen, refer to the stroke run-sheet and chart.  For time of CT, refer to the radiology report. See A&P below for TPA Decision and door to needle time if and when applicable.    HPI: Priscila Flanagan is a 85 y.o. woman with a hx of HTN presenting as a transfer from OS ([facility]) for whom neurology has been consulted for acute onset left sided weakness.  The patient incurred a ground level fall at 1000 on 11/19/20 after a visit at the cardiology office out patient; this is the LKN.  EMS was called and found patient at 1200 with left sided weakness and left facial droop.  Patient was brought initially to [facility] where she received tPA; bolus pushed at 1234.  Neuroimaging demonstrated LVO of the right MCA, M1 division.  Patient was subsequently transferred to [facility] for emergent IR intervention.  The patient denies headache.  Perseverating on \"I have to pee.\"    Review of systems: In addition to what is detailed in the HPI above, all other systems reviewed and are negative.    Past Medical History:    has a past medical history of Hypertension and Post-menopause (10/22/2014). She also has no past medical history of Asthma or Type II or unspecified type diabetes mellitus without mention of complication, not stated as uncontrolled.    FHx:  family history includes Cancer in her mother and sister; Genetic Disorder in her brother; No Known Problems in her father, maternal grandfather, maternal grandmother, paternal grandfather, and paternal grandmother.    SHx:   reports that she quit smoking about 59 years ago. Her smoking use included cigarettes. She has a 2.50 pack-year smoking history. She has never used smokeless tobacco. She reports current alcohol use. She reports that she " does not use drugs.    Allergies:  No Known Allergies    Medications:  No current facility-administered medications for this encounter.     Current Outpatient Medications:   •  hydroCHLOROthiazide (HYDRODIURIL) 25 MG Tab, Take 1 Tab by mouth every day., Disp: 30 Tab, Rfl: 0  •  atenolol (TENORMIN) 50 MG Tab, TAKE 1 TABLET DAILY, Disp: 90 Tab, Rfl: 3  •  Diphenhydramine-APAP, sleep, (TYLENOL PM EXTRA STRENGTH)  MG Tab, Take  by mouth., Disp: , Rfl:   •  aspirin 81 MG tablet, Take 81 mg by mouth every day., Disp: , Rfl:     Physical Examination:    Vitals:    11/19/20 1405 11/19/20 1426 11/19/20 1427   BP: 118/66 (!) 174/89    Pulse: 88 90 87   Resp: 20 (!) 23    SpO2: 96% 93% 97%       General: Patient is drowsy and in no acute distress  Eyes: examination of optic disks not indicated at this time given acuity of consult  CV: RRR    NEUROLOGICAL EXAM:     Mental status: drowsy, alert and disoriented, follows simple commands  Speech and language: speech is moderately dysarthric. The patient is able to name and repeat.  Cranial nerve exam: Pupils are equal, round and reactive to light bilaterally. Visual fields are full on blink to threat bilaterally. Extraocular muscles: right gaze preference but can overcome midline. Sensation in the face is intact to light touch. Face is notable for moderate left sided droop. Hearing to finger rub equal. Palate elevates symmetrically. Shoulder shrug is full. Tongue is midline.  Motor exam: left sided hemiparesis in the arm 3/5 and plegic LLE 1/5. Tone is decreased on left compared to right. No abnormal movements were seen on exam.  Sensory exam: diminished withdrawal from noxious stim LLE with neglect to double simultaneous stimulation  Deep tendon reflexes: 1+ and symmetric. Toes up going on the left and down on the right  Coordination: no ataxia   Gait: deferred given patient is actively being transported to IR suite    NIH Stroke Scale:    1a. Level of Consciousness (Alert,  drowsy, etc): 1= Drowsy    1b. LOC Questions (Month, age): 0= Answers both correctly    1c. LOC Commands (Open/close eyes make fist/let go): 0= Obeys both correctly    2.   Best Gaze (Eyes open - patient follows examiner's finger on face): 1= Partial gaze palay    3.   Visual Fields (introduce visual stimulus/threat to patient's field quadrants): 0= No visual loss  4.   Facial Paresis (Show teeth, raise eyebrows and squeeze eyes shut): 2 = Partial     5a. Motor Arm - Left (Elevate arm to 90 degrees if patient is sitting, 45 degrees if  supine): 2= Can't resist gravity    5b. Motor Arm - Right (Elevate arm to 90 degrees if patient is sitting, 45 degrees if supine): 0= No drift    6a. Motor Leg - Left (Elevate leg 30 degrees with patient supine): 3= No effort against gravity    6b. Motor Leg - Right  (Elevate leg 30 degrees with patient supine): 0= No drift    7.   Limb Ataxia (Finger-nose, heel down shin): 0= No ataxia    8.   Sensory (Pin prick to face, arm, trunk and leg - compare side to side): 1= Partial loss    9.  Best Language (Name item, describe a picture and read sentences): 0= No aphasia    10. Dysarthria (Evaluate speech clarity by patient repeating listed words): 1= Mild to moderate slurring    11. Extinction and Inattention (Use information from prior testing to identify neglect or  double simultaneous stimuli testing): 1= Partial neglect    Total NIH Score: 12    Modified Raymond Scale (MRS): 1 = No significant disability, despite symptoms; able to perform all usual duties and activities    Objective Data:    Labs:  No results found for: PROTHROMBTM, INR   Lab Results   Component Value Date/Time    WBC 14.7 (H) 11/19/2020 02:18 PM    RBC 4.45 11/19/2020 02:18 PM    HEMOGLOBIN 14.2 11/19/2020 02:18 PM    HEMATOCRIT 43.2 11/19/2020 02:18 PM    MCV 97.1 11/19/2020 02:18 PM    MCH 31.9 11/19/2020 02:18 PM    MCHC 32.9 (L) 11/19/2020 02:18 PM    MPV 11.9 11/19/2020 02:18 PM    NEUTSPOLYS 85.10 (H) 11/19/2020  02:18 PM    LYMPHOCYTES 10.00 (L) 11/19/2020 02:18 PM    MONOCYTES 3.50 11/19/2020 02:18 PM    EOSINOPHILS 0.60 11/19/2020 02:18 PM    BASOPHILS 0.30 11/19/2020 02:18 PM      Lab Results   Component Value Date/Time    SODIUM 138 01/13/2018 07:11 AM    POTASSIUM 3.4 (L) 01/13/2018 07:11 AM    CHLORIDE 99 01/13/2018 07:11 AM    CO2 29 01/13/2018 07:11 AM    GLUCOSE 101 (H) 01/13/2018 07:11 AM    BUN 19 01/13/2018 07:11 AM    CREATININE 0.65 01/13/2018 07:11 AM      Lab Results   Component Value Date/Time    CHOLSTRLTOT 211 (H) 01/13/2018 07:11 AM     (H) 01/13/2018 07:11 AM    HDL 55 01/13/2018 07:11 AM    TRIGLYCERIDE 175 (H) 01/13/2018 07:11 AM       Lab Results   Component Value Date/Time    ALKPHOSPHAT 66 01/13/2018 07:11 AM    ASTSGOT 18 01/13/2018 07:11 AM    ALTSGPT 15 01/13/2018 07:11 AM    TBILIRUBIN 0.6 01/13/2018 07:11 AM        Imaging/Testing:    I interpreted and/or reviewed the patient's neuroimaging    CT-FOREIGN FILM CAT SCAN   Final Result      IR-THROMBO MECHANICAL ARTERY,INIT    (Results Pending)       Assessment and Plan:    Priscila Flanagna is a 85 y.o. 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  S/p tPA at [facility] and sent to [facility] 11/19/20 for acute IR intervention via mechanical thrombectomy.  Most likely etiology is large vessel disease ie atheroembolic vs. Cardioembolic.    After the endovascular intervention, please follow the following recommendations regarding blood pressure parameters:    If TICI 3: maintain systolic BP < 140  If TICI 2b: maintain systolic BP < 160  If TICI 2a or less, maintain systolic BP < 180 per tPA protocol    This is in an effort to minimize reperfusion injury and/or hemorrhagic conversion    Plan:    - ACUTE TREATMENT WITH tPA and IR INTERVENTION as managed with pharmacy and IR  - Admit to the intensive care unit  - Neurology checks and vital signs  per protocol; perform swallow screen  - Maintain blood pressure per guidelines as detailed above  - obtain normoglycemia and avoid hypo- or hyper -natremia; aim for normothermia  - Hold antiplatelets and any blood thinners for 24 hours status post tPA administration  - high intensity statin per SPARCL Trial if and when safe to swallow  - serum studies for stroke risk factors: lipid panel & hemoglobin A1C  - To identify stroke territory, obtain MRI brain  - Obtain a 2D echocardiogram w/ bubble study  - evaluate and treat with PT/OT/ST; physiatry consult  - stroke education    9289 addendum: TICI 2b.  GOAL SBP < 160    The evaluation of the patient, and recommended management, was discussed with Dr. Reaves, [place] & with Dr. Melton, IR.    Medhat Nolasco MD  Neurohospitalist, Acute Care Services   of Neurology

## 2020-11-19 NOTE — OR SURGEON
Immediate Post- Operative Note        PostOp Diagnosis:Right M1 thrombus       Procedure(s): Right MCA thrombectomy       Estimated Blood Loss: Less than 5 ml        Complications: None            11/19/2020     3:21 PM     Uriel Melton M.D.

## 2020-11-19 NOTE — ASSESSMENT & PLAN NOTE
R MCA M1 LVO  S/p TPA and then thrombectomy 11/19  Neuro consulting  Goal BP: sbp <160, labetolol prn and nicardipine if need needed  PT/OT/SLP  TTE  CT 24 hours post TPA  ASA post-CT tomorrow  Check lipids, A1C  Statin when able to take PO or enteral access is available

## 2020-11-20 ENCOUNTER — APPOINTMENT (OUTPATIENT)
Dept: RADIOLOGY | Facility: MEDICAL CENTER | Age: 85
DRG: 024 | End: 2020-11-20
Attending: HOSPITALIST
Payer: MEDICARE

## 2020-11-20 ENCOUNTER — APPOINTMENT (OUTPATIENT)
Dept: CARDIOLOGY | Facility: MEDICAL CENTER | Age: 85
DRG: 024 | End: 2020-11-20
Attending: HOSPITALIST
Payer: MEDICARE

## 2020-11-20 LAB
ALBUMIN SERPL BCP-MCNC: 3.5 G/DL (ref 3.2–4.9)
ALBUMIN/GLOB SERPL: 1.5 G/DL
ALP SERPL-CCNC: 67 U/L (ref 30–99)
ALT SERPL-CCNC: 36 U/L (ref 2–50)
ANION GAP SERPL CALC-SCNC: 12 MMOL/L (ref 7–16)
ANION GAP SERPL CALC-SCNC: 9 MMOL/L (ref 7–16)
AST SERPL-CCNC: 33 U/L (ref 12–45)
BASOPHILS # BLD AUTO: 0.4 % (ref 0–1.8)
BASOPHILS # BLD: 0.04 K/UL (ref 0–0.12)
BILIRUB SERPL-MCNC: 0.5 MG/DL (ref 0.1–1.5)
BUN SERPL-MCNC: 12 MG/DL (ref 8–22)
BUN SERPL-MCNC: 13 MG/DL (ref 8–22)
CALCIUM SERPL-MCNC: 8.6 MG/DL (ref 8.5–10.5)
CALCIUM SERPL-MCNC: 8.7 MG/DL (ref 8.5–10.5)
CHLORIDE SERPL-SCNC: 102 MMOL/L (ref 96–112)
CHLORIDE SERPL-SCNC: 99 MMOL/L (ref 96–112)
CHOLEST SERPL-MCNC: 154 MG/DL (ref 100–199)
CO2 SERPL-SCNC: 25 MMOL/L (ref 20–33)
CO2 SERPL-SCNC: 28 MMOL/L (ref 20–33)
CREAT SERPL-MCNC: 0.6 MG/DL (ref 0.5–1.4)
CREAT SERPL-MCNC: 0.67 MG/DL (ref 0.5–1.4)
EKG IMPRESSION: NORMAL
EOSINOPHIL # BLD AUTO: 0.03 K/UL (ref 0–0.51)
EOSINOPHIL NFR BLD: 0.3 % (ref 0–6.9)
ERYTHROCYTE [DISTWIDTH] IN BLOOD BY AUTOMATED COUNT: 50.6 FL (ref 35.9–50)
EST. AVERAGE GLUCOSE BLD GHB EST-MCNC: 131 MG/DL
GLOBULIN SER CALC-MCNC: 2.3 G/DL (ref 1.9–3.5)
GLUCOSE SERPL-MCNC: 107 MG/DL (ref 65–99)
GLUCOSE SERPL-MCNC: 136 MG/DL (ref 65–99)
HBA1C MFR BLD: 6.2 % (ref 0–5.6)
HCT VFR BLD AUTO: 33.3 % (ref 37–47)
HDLC SERPL-MCNC: 47 MG/DL
HGB BLD-MCNC: 10.9 G/DL (ref 12–16)
IMM GRANULOCYTES # BLD AUTO: 0.05 K/UL (ref 0–0.11)
IMM GRANULOCYTES NFR BLD AUTO: 0.5 % (ref 0–0.9)
LDLC SERPL CALC-MCNC: 82 MG/DL
LV EJECT FRACT  99904: 65
LV EJECT FRACT MOD 2C 99903: 74.36
LV EJECT FRACT MOD 4C 99902: 60.89
LV EJECT FRACT MOD BP 99901: 65.55
LYMPHOCYTES # BLD AUTO: 2.77 K/UL (ref 1–4.8)
LYMPHOCYTES NFR BLD: 26.1 % (ref 22–41)
MAGNESIUM SERPL-MCNC: 1.4 MG/DL (ref 1.5–2.5)
MAGNESIUM SERPL-MCNC: 2.9 MG/DL (ref 1.5–2.5)
MCH RBC QN AUTO: 31.2 PG (ref 27–33)
MCHC RBC AUTO-ENTMCNC: 32.7 G/DL (ref 33.6–35)
MCV RBC AUTO: 95.4 FL (ref 81.4–97.8)
MONOCYTES # BLD AUTO: 0.66 K/UL (ref 0–0.85)
MONOCYTES NFR BLD AUTO: 6.2 % (ref 0–13.4)
NEUTROPHILS # BLD AUTO: 7.08 K/UL (ref 2–7.15)
NEUTROPHILS NFR BLD: 66.5 % (ref 44–72)
NRBC # BLD AUTO: 0 K/UL
NRBC BLD-RTO: 0 /100 WBC
PHOSPHATE SERPL-MCNC: 2.3 MG/DL (ref 2.5–4.5)
PLATELET # BLD AUTO: 168 K/UL (ref 164–446)
PMV BLD AUTO: 12.3 FL (ref 9–12.9)
POTASSIUM SERPL-SCNC: 2.9 MMOL/L (ref 3.6–5.5)
POTASSIUM SERPL-SCNC: 5.4 MMOL/L (ref 3.6–5.5)
PROT SERPL-MCNC: 5.8 G/DL (ref 6–8.2)
RBC # BLD AUTO: 3.49 M/UL (ref 4.2–5.4)
SODIUM SERPL-SCNC: 133 MMOL/L (ref 135–145)
SODIUM SERPL-SCNC: 142 MMOL/L (ref 135–145)
TRIGL SERPL-MCNC: 127 MG/DL (ref 0–149)
TROPONIN T SERPL-MCNC: 19 NG/L (ref 6–19)
WBC # BLD AUTO: 10.6 K/UL (ref 4.8–10.8)

## 2020-11-20 PROCEDURE — 770020 HCHG ROOM/CARE - TELE (206)

## 2020-11-20 PROCEDURE — 700111 HCHG RX REV CODE 636 W/ 250 OVERRIDE (IP): Performed by: HOSPITALIST

## 2020-11-20 PROCEDURE — 93306 TTE W/DOPPLER COMPLETE: CPT

## 2020-11-20 PROCEDURE — 700102 HCHG RX REV CODE 250 W/ 637 OVERRIDE(OP): Performed by: HOSPITALIST

## 2020-11-20 PROCEDURE — 70551 MRI BRAIN STEM W/O DYE: CPT

## 2020-11-20 PROCEDURE — 99233 SBSQ HOSP IP/OBS HIGH 50: CPT | Performed by: PSYCHIATRY & NEUROLOGY

## 2020-11-20 PROCEDURE — A9270 NON-COVERED ITEM OR SERVICE: HCPCS | Performed by: HOSPITALIST

## 2020-11-20 PROCEDURE — 84484 ASSAY OF TROPONIN QUANT: CPT

## 2020-11-20 PROCEDURE — 99233 SBSQ HOSP IP/OBS HIGH 50: CPT | Performed by: HOSPITALIST

## 2020-11-20 PROCEDURE — 92610 EVALUATE SWALLOWING FUNCTION: CPT

## 2020-11-20 PROCEDURE — 80061 LIPID PANEL: CPT

## 2020-11-20 PROCEDURE — 80048 BASIC METABOLIC PNL TOTAL CA: CPT

## 2020-11-20 PROCEDURE — 93306 TTE W/DOPPLER COMPLETE: CPT | Mod: 26 | Performed by: INTERNAL MEDICINE

## 2020-11-20 PROCEDURE — 93005 ELECTROCARDIOGRAM TRACING: CPT | Performed by: STUDENT IN AN ORGANIZED HEALTH CARE EDUCATION/TRAINING PROGRAM

## 2020-11-20 PROCEDURE — 84100 ASSAY OF PHOSPHORUS: CPT

## 2020-11-20 PROCEDURE — 83036 HEMOGLOBIN GLYCOSYLATED A1C: CPT

## 2020-11-20 PROCEDURE — 97162 PT EVAL MOD COMPLEX 30 MIN: CPT

## 2020-11-20 PROCEDURE — 85025 COMPLETE CBC W/AUTO DIFF WBC: CPT

## 2020-11-20 PROCEDURE — 83735 ASSAY OF MAGNESIUM: CPT

## 2020-11-20 PROCEDURE — 80053 COMPREHEN METABOLIC PANEL: CPT

## 2020-11-20 PROCEDURE — 93010 ELECTROCARDIOGRAM REPORT: CPT | Performed by: INTERNAL MEDICINE

## 2020-11-20 PROCEDURE — 70450 CT HEAD/BRAIN W/O DYE: CPT

## 2020-11-20 RX ORDER — POTASSIUM CHLORIDE 7.45 MG/ML
10 INJECTION INTRAVENOUS
Status: COMPLETED | OUTPATIENT
Start: 2020-11-20 | End: 2020-11-20

## 2020-11-20 RX ORDER — MAGNESIUM SULFATE HEPTAHYDRATE 40 MG/ML
4 INJECTION, SOLUTION INTRAVENOUS ONCE
Status: COMPLETED | OUTPATIENT
Start: 2020-11-20 | End: 2020-11-20

## 2020-11-20 RX ADMIN — POTASSIUM CHLORIDE 10 MEQ: 7.46 INJECTION, SOLUTION INTRAVENOUS at 10:06

## 2020-11-20 RX ADMIN — POTASSIUM CHLORIDE 10 MEQ: 7.46 INJECTION, SOLUTION INTRAVENOUS at 09:11

## 2020-11-20 RX ADMIN — ASPIRIN 325 MG: 325 TABLET, FILM COATED ORAL at 15:43

## 2020-11-20 RX ADMIN — DOCUSATE SODIUM 50 MG AND SENNOSIDES 8.6 MG 2 TABLET: 8.6; 5 TABLET, FILM COATED ORAL at 17:12

## 2020-11-20 RX ADMIN — POTASSIUM CHLORIDE 10 MEQ: 7.46 INJECTION, SOLUTION INTRAVENOUS at 11:13

## 2020-11-20 RX ADMIN — MAGNESIUM SULFATE 4 G: 4 INJECTION INTRAVENOUS at 08:00

## 2020-11-20 RX ADMIN — POTASSIUM CHLORIDE 10 MEQ: 7.46 INJECTION, SOLUTION INTRAVENOUS at 07:56

## 2020-11-20 RX ADMIN — ATORVASTATIN CALCIUM 40 MG: 40 TABLET, FILM COATED ORAL at 17:12

## 2020-11-20 ASSESSMENT — ENCOUNTER SYMPTOMS
CLAUDICATION: 0
TREMORS: 0
NECK PAIN: 0
CONSTIPATION: 0
NERVOUS/ANXIOUS: 0
COUGH: 0
FEVER: 0
BLURRED VISION: 0
SENSORY CHANGE: 0
DIZZINESS: 0
HEMOPTYSIS: 0
CHILLS: 0
PHOTOPHOBIA: 0
SORE THROAT: 0
SPUTUM PRODUCTION: 0
VOMITING: 0
SEIZURES: 0
SINUS PAIN: 0
SPEECH CHANGE: 0
ABDOMINAL PAIN: 0
BACK PAIN: 1
ORTHOPNEA: 0
TINGLING: 0
DOUBLE VISION: 0
NAUSEA: 0
STRIDOR: 0
BLOOD IN STOOL: 0
DIARRHEA: 0
SHORTNESS OF BREATH: 0
HEADACHES: 0
PALPITATIONS: 0
LOSS OF CONSCIOUSNESS: 0
DEPRESSION: 0
BACK PAIN: 0
MYALGIAS: 0
FOCAL WEAKNESS: 0
EYE DISCHARGE: 0

## 2020-11-20 ASSESSMENT — COGNITIVE AND FUNCTIONAL STATUS - GENERAL
CLIMB 3 TO 5 STEPS WITH RAILING: A LOT
WALKING IN HOSPITAL ROOM: A LITTLE
MOVING TO AND FROM BED TO CHAIR: A LITTLE
MOBILITY SCORE: 17
STANDING UP FROM CHAIR USING ARMS: A LITTLE
MOVING FROM LYING ON BACK TO SITTING ON SIDE OF FLAT BED: A LITTLE
SUGGESTED CMS G CODE MODIFIER MOBILITY: CK
TURNING FROM BACK TO SIDE WHILE IN FLAT BAD: A LITTLE

## 2020-11-20 ASSESSMENT — PAIN DESCRIPTION - PAIN TYPE
TYPE: ACUTE PAIN
TYPE: ACUTE PAIN

## 2020-11-20 ASSESSMENT — GAIT ASSESSMENTS
DISTANCE (FEET): 400
GAIT LEVEL OF ASSIST: SUPERVISED
ASSISTIVE DEVICE: FRONT WHEEL WALKER

## 2020-11-20 ASSESSMENT — LIFESTYLE VARIABLES: SUBSTANCE_ABUSE: 0

## 2020-11-20 NOTE — PROGRESS NOTES
[facility] Progress Note      Admit Date: 11/19/2020    Resident(s): Lynda Ayala M.D.   Attending:  OBI BETHEA/ Dr. Iraheta    Patient ID:    Name:  Priscila Flanagan   YOB: 1934  Age:  85 y.o.  female   MRN:  [id number]    Hospital Course (carried forward and updated):  Priscila Flanagan is a 85 y.o. female with  Hypertension, diabetes and Post-menopause who initially presented to [facility] with concern of acute stroke with left side deficits .  She had Imaging done which showed MCA M1 LVO, received TPA then was transferred to [facility] for thrombectomy.  NIHSS was 6-8 on presentation. Patient was transferred to ICU after the procedure for 24 hours monitoring and blood pressure management.Patient showed significant improvement after the procedure.Left side weakness resolved. NIHSS 2 in ICU. On nicardipine and prn medications for BP . Aspirin on hold for 24 hours after tPA.  Patient continued to be stable ,Alert and oriented. 24 hour MRI or CT is pending.    Consultants:  Critical Care  Neurology     Interval Events:  No acute events overnight   Alert oriented x 4   Improvement in neurological function. No new complaints.  No headache, no speech difficulty. Strength improved.  Speech evaluated - regular diced soft diet   Patient did c/o mild low back pain 4/10 - but doenot want any medications ( denied topical meds too). Around noon c/o mild chest pain ( h/o intermittent chest pain ) . EKG,troponin and BMP done. No acute abnormality. Pain resolved spontaneously in an hour.  PT and OT to work with patient.\  Start aspirin after 24 hours   MRI brain or CT head in 24 hours of thrombectomy.       Review of Systems   Constitutional: Negative for chills, fever and malaise/fatigue.   HENT: Negative for sinus pain and sore throat.    Eyes: Negative for blurred vision, double vision and photophobia.   Respiratory: Negative for cough, hemoptysis, sputum production and stridor.    Cardiovascular: Negative  "for palpitations, orthopnea and claudication.        Chest discomfort - resolved spontaneously.    Gastrointestinal: Negative for abdominal pain, blood in stool, constipation, diarrhea, nausea and vomiting.   Genitourinary: Negative for dysuria and urgency.   Musculoskeletal: Positive for back pain. Negative for myalgias and neck pain.   Skin: Negative for rash.   Neurological: Negative for dizziness, tingling, tremors, sensory change, speech change, focal weakness, seizures, loss of consciousness and headaches.   Psychiatric/Behavioral: Negative for depression, substance abuse and suicidal ideas.       PHYSICAL EXAM:  Vitals:    11/20/20 1100 11/20/20 1200 11/20/20 1300 11/20/20 1400   BP: 119/59 149/66 151/71 144/67   Pulse: 78 94 75 82   Resp: 19 (!) 24 (!) 33 20   Temp:  36.9 °C (98.4 °F)  36.8 °C (98.2 °F)   TempSrc:  Temporal  Temporal   SpO2: 95% 94% 94% 93%   Weight:       Height:        Body mass index is 24.98 kg/m².  Latest Vitals:  /67   Pulse 82   Temp 36.8 °C (98.2 °F) (Temporal)   Resp 20   Ht 1.626 m (5' 4\")   Wt 66 kg (145 lb 8.1 oz)   SpO2 93%   BMI 24.98 kg/m²   O2 therapy: Pulse Oximetry: 93 %, O2 (LPM): 0, O2 Delivery Device: None - Room Air  Vitals Range last 24h:  Temp:  [36.2 °C (97.2 °F)-37.2 °C (98.9 °F)] 36.8 °C (98.2 °F)  Pulse:  [64-94] 82  Resp:  [14-82] 20  BP: ()/(48-86) 144/67  SpO2:  [91 %-99 %] 93 %  Date 11/20/20 0700 - 11/21/20 0659   Shift 3447-8323 7157-4862 2779-8728 24 Hour Total   INTAKE   P.O. 480   480     P.O. 480   480   I.V. 100   100     Magnesium Sulfate Volume 100   100   IV Piggyback 426.7   426.7     Volume (mL) (potassium chloride (KCL) ivpb 10 mEq) 58.3   58.3     Volume (mL) (potassium chloride (KCL) ivpb 10 mEq) 368.3   368.3   Shift Total 1006.7   1006.7   OUTPUT   Urine 150   150     Number of Times Voided 1 x   1 x     Urine Void (mL) 150   150   Stool         Number of Times Stooled 0 x   0 x   Shift Total 150   150   .7   856.7 "        Intake/Output Summary (Last 24 hours) at 11/20/2020 1429  Last data filed at 11/20/2020 1400  Gross per 24 hour   Intake 1113.34 ml   Output 500 ml   Net 613.34 ml        Physical Exam   Constitutional: She is oriented to person, place, and time and well-developed, well-nourished, and in no distress. No distress.   HENT:   Head: Normocephalic and atraumatic.   Mouth/Throat: Oropharynx is clear and moist. No oropharyngeal exudate.   Eyes: Pupils are equal, round, and reactive to light. Conjunctivae and EOM are normal. No scleral icterus.   Neck: Normal range of motion. Neck supple. No JVD present.   Cardiovascular: Normal rate, regular rhythm, normal heart sounds and intact distal pulses.   No murmur heard.  Pulmonary/Chest: Effort normal and breath sounds normal. No respiratory distress. She has no wheezes. She has no rales.   Abdominal: Soft. Bowel sounds are normal. She exhibits no distension. There is no abdominal tenderness. There is no rebound.   Musculoskeletal: Normal range of motion.         General: No edema.   Lymphadenopathy:     She has no cervical adenopathy.   Neurological: She is alert and oriented to person, place, and time. She displays normal reflexes.   Mild facial droop - improved    Skin: Skin is warm. No rash noted. She is not diaphoretic. No erythema.   Psychiatric: Mood, affect and judgment normal.           Recent Labs     11/19/20  1418 11/19/20  1730 11/20/20  0245 11/20/20  1149   SODIUM 139  --  142 133*   POTASSIUM 3.0*  --  2.9* 5.4   CHLORIDE 100  --  102 99   CO2 24  --  28 25   BUN 20  --  13 12   CREATININE 0.66  --  0.60 0.67   MAGNESIUM  --  1.5 1.4* 2.9*   PHOSPHORUS  --   --   --  2.3*   CALCIUM 9.6  --  8.6 8.7     Recent Labs     11/19/20  1418 11/20/20  0245 11/20/20  1149   ALTSGPT 48 36  --    ASTSGOT 54* 33  --    ALKPHOSPHAT 102* 67  --    TBILIRUBIN 0.5 0.5  --    GLUCOSE 137* 107* 136*     Recent Labs     11/19/20  1418 11/20/20  0245   RBC 4.45 3.49*    HEMOGLOBIN 14.2 10.9*   HEMATOCRIT 43.2 33.3*   PLATELETCT 174 168   PROTHROMBTM 14.4  --    APTT 35.1  --    INR 1.08  --      Recent Labs     11/19/20  1418 11/20/20  0245   WBC 14.7* 10.6   NEUTSPOLYS 85.10* 66.50   LYMPHOCYTES 10.00* 26.10   MONOCYTES 3.50 6.20   EOSINOPHILS 0.60 0.30   BASOPHILS 0.30 0.40   ASTSGOT 54* 33   ALTSGPT 48 36   ALKPHOSPHAT 102* 67   TBILIRUBIN 0.5 0.5       Meds:  • senna-docusate  2 Tab      And   • polyethylene glycol/lytes  1 Packet      And   • magnesium hydroxide  30 mL      And   • bisacodyl  10 mg     • acetaminophen  650 mg     • ondansetron  4 mg     • ondansetron  4 mg     • atorvastatin  40 mg     • aspirin  325 mg      Or   • aspirin  324 mg      Or   • aspirin  300 mg     • labetalol  10 mg          Procedures:  underwent thrombectomy for M1 occlusion - 11/19/20      Imaging:  EC-ECHOCARDIOGRAM COMPLETE W/O CONT   Final Result      IR-THROMBO MECHANICAL ARTERY,INIT   Final Result      1.  Occluded right distal M1 segment   2.  Mechanical thrombectomy   3.  mTICI 2 c flow      CT-FOREIGN FILM CAT SCAN   Final Result      CT-HEAD W/O    (Results Pending)   MR-BRAIN-W/O    (Results Pending)       Problem and Plan:    * CVA (cerebral vascular accident) (HCC)  Assessment & Plan  Occurred 11/19     [facility] and given Alteplase and sent to [facility] for concern of  M1 occlusion for emergent thrombectomy.  S/P thrombectomy with improvement and minimal residual deficits  Monitor 24 hours in ICU with q 1 hr neurochecks.   atorvastatin  ASA s/p 24hr alteplase fter 24 hr imaging  Await MRI brain  Keep SBP <140-160 s/p thrombectomy and alteplase  PT/OT/ST  24 hr CT pending ( MRI delayed)  MRI brain pending  echocardiogram w/ bubble study done. No acute findings  Stroke education      Chest pain  Assessment & Plan  11/20 EKG shows some ST depression but stable clinically.   Troponin normal . Electrolytes corrected  Max medical therapy as able.  Await 24 hrs for aspirin -  start after stable 24 hr imaging  Continue statin.  Consider future stress test once able to advance possible medical therapy    Hypokalemia  Assessment & Plan   Monitor and replete as needed       Essential hypertension  Assessment & Plan  Monitor vitals.  Prn BP medications  Hold atenolol and HCTZ      DISPO: ICU for 24 hours monitoring post procedure. Transfer to floor once 24 hr imaging is stable     CODE STATUS: Full code    Quality Measures:  Feeding: oral regular-easy to chew diet with thin liquid diet   Analgesia: tylenol  Sedation: none  Thromboprophylaxis: scds  Head of bed: >30 degrees  Glycemic control: stable  Bowel care: bowel regimen as needed  Indwelling lines: PIV  Deescalation of antibiotics: none      Lynda Ayala M.D.

## 2020-11-20 NOTE — PROGRESS NOTES
[facility] Medicine Daily Progress Note    Date of Service  11/20/2020    Chief Complaint  Fall at home and left sided weakness    Hospital Course  85 y.o. female with a history of hypertension that while eating lunch 11/19 dropped her soup and went to pick it up and fell down onto the floor unable to get up.  She had texted her son who came over and noticed an issue and called paramedics.  Paramedics were concerned of acute stroke with left-sided deficits and sent the patient to [facility].  At [facility] was concerned of acute stroke she was given alteplase and found to have an M1 occlusion on the right side.  She was then sent to [facility] for need of emergent thrombectomy.  She underwent thrombectomy here at [facility] and had near resolve of her left-sided deficits.    Interval Problem Update  Awake and alert  Speech clear.  Slight left side mouth droop and slight left leg weakness  Await 24hr s/p TPA before transfer out to Neurology floor today.  Seen by Speech and Diet recommendation: Recommend pt begin a regular-easy to chew diet with thin liquid diet with monitoring during meals and implementation of strategies (small bites/sips, up at 90* during meals, slow rate)    Consultants/Specialty  Neurology  Intensivist    Code Status  Full Code    Disposition  Transfer to neurology s/p 24hr Alteplase     Review of Systems  Review of Systems   Constitutional: Negative for chills and fever.   Eyes: Negative for discharge.   Respiratory: Negative for cough, shortness of breath and stridor.    Cardiovascular: Negative for chest pain, palpitations and leg swelling.   Gastrointestinal: Negative for abdominal pain, diarrhea, nausea and vomiting.   Genitourinary: Negative for dysuria and hematuria.   Musculoskeletal: Negative for back pain and joint pain.   Skin: Negative for rash.   Neurological: Negative for dizziness, sensory change, speech change and headaches.   Psychiatric/Behavioral: Negative for  depression. The patient is not nervous/anxious.         Physical Exam  Temp:  [36.2 °C (97.2 °F)-37.2 °C (98.9 °F)] 36.9 °C (98.4 °F)  Pulse:  [64-94] 94  Resp:  [14-82] 24  BP: ()/(48-89) 149/66  SpO2:  [91 %-99 %] 94 %    Physical Exam  Vitals signs reviewed.   Constitutional:       Appearance: Normal appearance. She is not diaphoretic.   HENT:      Head: Normocephalic and atraumatic.      Nose: Nose normal.      Mouth/Throat:      Mouth: Mucous membranes are moist.      Pharynx: No oropharyngeal exudate.   Eyes:      General: No scleral icterus.        Right eye: No discharge.         Left eye: No discharge.      Extraocular Movements: Extraocular movements intact.      Conjunctiva/sclera: Conjunctivae normal.   Neck:      Musculoskeletal: No muscular tenderness.   Cardiovascular:      Rate and Rhythm: Normal rate and regular rhythm.      Pulses:           Radial pulses are 2+ on the right side and 2+ on the left side.        Dorsalis pedis pulses are 2+ on the right side and 2+ on the left side.      Heart sounds: No murmur.   Pulmonary:      Effort: Pulmonary effort is normal. No respiratory distress.      Breath sounds: Normal breath sounds. No wheezing or rales.   Abdominal:      General: Bowel sounds are normal. There is no distension.      Palpations: Abdomen is soft.   Musculoskeletal:         General: No swelling or tenderness.   Lymphadenopathy:      Cervical: No cervical adenopathy.   Skin:     Coloration: Skin is not jaundiced or pale.      Findings: Bruising present.   Neurological:      General: No focal deficit present.      Mental Status: She is alert and oriented to person, place, and time. Mental status is at baseline.      Cranial Nerves: Cranial nerve deficit present.      Sensory: No sensory deficit.      Motor: Weakness present.   Psychiatric:         Mood and Affect: Mood normal.         Behavior: Behavior normal.         Fluids    Intake/Output Summary (Last 24 hours) at 11/20/2020  1314  Last data filed at 11/20/2020 1200  Gross per 24 hour   Intake 971.67 ml   Output 500 ml   Net 471.67 ml       Laboratory  Recent Labs     11/19/20  1418 11/20/20  0245   WBC 14.7* 10.6   RBC 4.45 3.49*   HEMOGLOBIN 14.2 10.9*   HEMATOCRIT 43.2 33.3*   MCV 97.1 95.4   MCH 31.9 31.2   MCHC 32.9* 32.7*   RDW 51.5* 50.6*   PLATELETCT 174 168   MPV 11.9 12.3     Recent Labs     11/19/20  1418 11/20/20  0245 11/20/20  1149   SODIUM 139 142 133*   POTASSIUM 3.0* 2.9* 5.4   CHLORIDE 100 102 99   CO2 24 28 25   GLUCOSE 137* 107* 136*   BUN 20 13 12   CREATININE 0.66 0.60 0.67   CALCIUM 9.6 8.6 8.7     Recent Labs     11/19/20  1418   APTT 35.1   INR 1.08         Recent Labs     11/20/20  0245   TRIGLYCERIDE 127   HDL 47   LDL 82       Imaging  EC-ECHOCARDIOGRAM COMPLETE W/O CONT   Final Result      IR-THROMBO MECHANICAL ARTERY,INIT   Final Result      1.  Occluded right distal M1 segment   2.  Mechanical thrombectomy   3.  mTICI 2 c flow      CT-FOREIGN FILM CAT SCAN   Final Result      CT-HEAD W/O    (Results Pending)   MR-BRAIN-W/O    (Results Pending)        Assessment/Plan  * CVA (cerebral vascular accident) (HCC)  Assessment & Plan  Occurred 11/19 while eating lunch. Then was taken by Paramedics to [facility] and given Alteplase and sent to [facility] for concern of  M1 occlusion and here had an emergent thrombectomy.  S/P thrombectomy with improvement and minimal residual deficits  Monitor 24 hours in ICU with q 1 hr neurochecks.  ASA 24hours from alteplase  Lipids reviewed   atorvastatin  ASA s/p 24hr alteplase  Await MRI brain  Keep SBP <140 s/p thrombectomy and alteplase  PT/OT/ST    Chest pain  Assessment & Plan  11/20 EKG shows stom ST depression  Max medical therapy as able.  Await okay from neurology for aspirin  Continue statin.  Consider future stress test once able to advance possible medical therapy    Hypokalemia  Assessment & Plan  11/20 K:2.9  11/19 K:3  Replete, monitor  Check  Mg    Essential hypertension  Assessment & Plan  Monitor vitals.  Hold atenolol and HCTZ       VTE prophylaxis: lovenox

## 2020-11-20 NOTE — H&P
Hospital Medicine History & Physical Note    Date of Service  11/19/2020    Primary Care Physician  Radha Underwood M.D.    Consultants  IR: Dr Melton  Neurology  Intensivist    Code Status  Full Code    Chief Complaint  Fall at home at lunch on day of admit with left side weakness and slurred speech    History of Presenting Illness  85 y.o. female with a history of hypertension that while eating lunch today dropped her soup and went to pick it up and fell down onto the floor unable to get up.  She had texted her son who came over and noticed an issue and called paramedics.  Paramedics were concerned of acute stroke with left-sided deficits and sent the patient to [facility].  At [facility] was concerned of acute stroke she was given alteplase and found to have an M1 occlusion on the right side.  She was then sent to [facility] for need of emergent thrombectomy.  She underwent thrombectomy here at [facility] and had near resolve of her left-sided deficits.    Patient is alert and awake sitting in the intensive care unit status post thrombectomy.  She is able to move her upper extremity with good fine motor control speech is clear.  She is able to move her left foot to command but on dorsiflexion has minimal weakness 4 out of 5 muscle strength.  Patient denies any headache.  She states when she fell she did not lose consciousness.  There is a bruise to the left lateral eyes/temporal area but states she did not hit that side of her head.  Patient denies any visual changes.    Review of Systems  Review of Systems   Constitutional: Negative for chills and fever.   Eyes: Negative for blurred vision and discharge.   Respiratory: Negative for cough, shortness of breath and stridor.    Cardiovascular: Negative for chest pain, palpitations and leg swelling.   Gastrointestinal: Negative for abdominal pain, diarrhea, nausea and vomiting.   Genitourinary: Negative for dysuria and hematuria.    Musculoskeletal: Positive for falls. Negative for back pain and joint pain.   Neurological: Positive for focal weakness. Negative for dizziness, sensory change, speech change and headaches.   Psychiatric/Behavioral: The patient is not nervous/anxious.        Past Medical History   has a past medical history of Hypertension and Post-menopause (10/22/2014).    Surgical History   has a past surgical history that includes abdominal hysterectomy total and open reduction. Two prior ankle surgeries. Tonsillectomy    Family History  family history includes Cancer in her mother and sister; Genetic Disorder in her brother; No Known Problems in her father, maternal grandfather, maternal grandmother, paternal grandfather, and paternal grandmother. Father  in his 80's.  Mother  age 55 from ovarian/cervical cancer.    Social History   reports that she quit smoking about 59 years ago. Her smoking use included cigarettes. She has a 2.50 pack-year smoking history. She has never used smokeless tobacco. She reports current alcohol use. She reports that she does not use drugs. . Has children and son lives with her.    Allergies  No Known Allergies    Medications  Prior to Admission Medications   Prescriptions Last Dose Informant Patient Reported? Taking?   Diphenhydramine-APAP, sleep, (TYLENOL PM EXTRA STRENGTH)  MG Tab   Yes No   Sig: Take  by mouth.   aspirin 81 MG tablet   Yes No   Sig: Take 81 mg by mouth every day.   atenolol (TENORMIN) 50 MG Tab   No No   Sig: TAKE 1 TABLET DAILY   hydroCHLOROthiazide (HYDRODIURIL) 25 MG Tab   No No   Sig: Take 1 Tab by mouth every day.      Facility-Administered Medications: None       Physical Exam  Temp:  [36.4 °C (97.6 °F)] 36.4 °C (97.6 °F)  Pulse:  [80-92] 80  Resp:  [14-24] 20  BP: (118-177)/(62-89) 153/65  SpO2:  [93 %-97 %] 97 %    Physical Exam  Vitals signs reviewed.   Constitutional:       Appearance: Normal appearance. She is not diaphoretic.   HENT:      Head:  Normocephalic and atraumatic.      Comments: Bruising lateral to left eye.     Nose: Nose normal.      Mouth/Throat:      Mouth: Mucous membranes are moist.      Pharynx: No oropharyngeal exudate.   Eyes:      General: No scleral icterus.        Right eye: No discharge.         Left eye: No discharge.      Extraocular Movements: Extraocular movements intact.      Conjunctiva/sclera: Conjunctivae normal.   Neck:      Musculoskeletal: No muscular tenderness.   Cardiovascular:      Rate and Rhythm: Normal rate and regular rhythm.      Pulses:           Radial pulses are 2+ on the right side and 2+ on the left side.        Dorsalis pedis pulses are 2+ on the right side and 2+ on the left side.      Heart sounds: No murmur.   Pulmonary:      Effort: Pulmonary effort is normal. No respiratory distress.      Breath sounds: Normal breath sounds. No wheezing or rales.   Abdominal:      General: Bowel sounds are normal. There is no distension.      Palpations: Abdomen is soft.      Tenderness: There is no abdominal tenderness.   Musculoskeletal:         General: No swelling or tenderness.      Right lower leg: No edema.      Left lower leg: No edema.   Lymphadenopathy:      Cervical: No cervical adenopathy.   Skin:     Coloration: Skin is not jaundiced or pale.   Neurological:      General: No focal deficit present.      Mental Status: She is alert and oriented to person, place, and time. Mental status is at baseline.      Cranial Nerves: No cranial nerve deficit.      Motor: Weakness (left dorsal flexion of foot 4/5 strength) present.   Psychiatric:         Mood and Affect: Mood normal.         Behavior: Behavior normal.         Laboratory:  Recent Labs     11/19/20  1418   WBC 14.7*   RBC 4.45   HEMOGLOBIN 14.2   HEMATOCRIT 43.2   MCV 97.1   MCH 31.9   MCHC 32.9*   RDW 51.5*   PLATELETCT 174   MPV 11.9     Recent Labs     11/19/20  1418   SODIUM 139   POTASSIUM 3.0*   CHLORIDE 100   CO2 24   GLUCOSE 137*   BUN 20    CREATININE 0.66   CALCIUM 9.6     Recent Labs     11/19/20  1418   ALTSGPT 48   ASTSGOT 54*   ALKPHOSPHAT 102*   TBILIRUBIN 0.5   GLUCOSE 137*     Recent Labs     11/19/20  1418   APTT 35.1   INR 1.08     No results for input(s): NTPROBNP in the last 72 hours.      Recent Labs     11/19/20  1418   TROPONINT 18       Imaging:  CT-FOREIGN FILM CAT SCAN   Final Result      IR-THROMBO MECHANICAL ARTERY,INIT    (Results Pending)   EC-ECHOCARDIOGRAM COMPLETE W/O CONT    (Results Pending)         Assessment/Plan:  I anticipate this patient will require at least two midnights for appropriate medical management, necessitating inpatient admission.    * CVA (cerebral vascular accident) (HCC)  Assessment & Plan  Occurred today while eating lunch. Then was taken by Paramedics to [facility] and given Alteplase and sent to [facility] for concern of  M1 occlusion and here had an emergent thrombectomy.  S/P thrombectomy with improvement and minimal residual deficits  Monitor 24 hours in ICU with q 1 hr neurochecks.  ASA 24hours from alteplase  Check lipids  Start atorvastatin  Keep SBP <140 s/p thrombectomy and alteplase    Hypokalemia  Assessment & Plan  11/19 K:3  Replete, monitor  Check Mg    Essential hypertension  Assessment & Plan  Monitor vitals.  Hold atenolol and HCTZ

## 2020-11-20 NOTE — PROGRESS NOTES
Neurology Progress Note  Neurohospitalist Service, [facility]    Referring Physician: Jaxson Iraheta M.D.    HPI: Refer to initial documented Neurology H&P, as detailed in the patient's chart.    Interval History: No acute events overnight.  Patient remains in ICU level of care without novel complaint(s).  Denies headache.    Review of systems: In addition to what is detailed in the HPI and/or updated in the interval history, all other systems reviewed and are negative.    Past Medical History:    has a past medical history of Hypertension and Post-menopause (10/22/2014). She also has no past medical history of Asthma or Type II or unspecified type diabetes mellitus without mention of complication, not stated as uncontrolled.    FHx:  family history includes Cancer in her mother and sister; Genetic Disorder in her brother; No Known Problems in her father, maternal grandfather, maternal grandmother, paternal grandfather, and paternal grandmother.    SHx:   reports that she quit smoking about 59 years ago. Her smoking use included cigarettes. She has a 2.50 pack-year smoking history. She has never used smokeless tobacco. She reports current alcohol use. She reports that she does not use drugs.    Medications:    Current Facility-Administered Medications:   •  magnesium sulfate IVPB premix 4 g, 4 g, Intravenous, Once, Alex Wall D.O., Last Rate: 25 mL/hr at 11/20/20 0800, 4 g at 11/20/20 0800  •  potassium chloride (KCL) ivpb 10 mEq, 10 mEq, Intravenous, Q HOUR, Alex Wall D.O., Last Rate: 100 mL/hr at 11/20/20 0756, 10 mEq at 11/20/20 0756  •  senna-docusate (PERICOLACE or SENOKOT S) 8.6-50 MG per tablet 2 Tab, 2 Tab, Oral, BID, Stopped at 11/19/20 1800 **AND** polyethylene glycol/lytes (MIRALAX) PACKET 1 Packet, 1 Packet, Oral, QDAY PRN **AND** magnesium hydroxide (MILK OF MAGNESIA) suspension 30 mL, 30 mL, Oral, QDAY PRN **AND** bisacodyl (DULCOLAX) suppository 10 mg, 10 mg,  Rectal, QDAY PRN, Alex Wall D.O.  •  acetaminophen (Tylenol) tablet 650 mg, 650 mg, Oral, Q6HRS PRN, Alex Wall D.O.  •  ondansetron (ZOFRAN) syringe/vial injection 4 mg, 4 mg, Intravenous, Q4HRS PRN, Alex Wall D.O.  •  ondansetron (ZOFRAN ODT) dispertab 4 mg, 4 mg, Oral, Q4HRS PRN, Alex Wall D.O.  •  atorvastatin (LIPITOR) tablet 40 mg, 40 mg, Oral, Q EVENING, Alex Wall D.O., Stopped at 11/19/20 1800  •  aspirin (ASA) tablet 325 mg, 325 mg, Oral, DAILY **OR** aspirin (ASA) chewable tab 324 mg, 324 mg, Oral, DAILY **OR** aspirin (ASA) suppository 300 mg, 300 mg, Rectal, DAILY, Alex Wall D.O.  •  labetalol (NORMODYNE/TRANDATE) injection 10 mg, 10 mg, Intravenous, Q10 MIN PRN, Alex Wall D.O., 10 mg at 11/19/20 1647    Physical Examination:     Vitals:    11/20/20 0500 11/20/20 0600 11/20/20 0700 11/20/20 0800   BP: (!) 87/48 118/56 118/57 128/60   Pulse: 64 65 66 70   Resp: (!) 35 17 (!) 24 (!) 46   Temp:    37.2 °C (98.9 °F)   TempSrc:    Temporal   SpO2: 97% 98% 98% 99%   Weight:       Height:           General: Patient is awake and in no acute distress  Eyes: examination of optic disks not indicated at this time  CV: RRR    NEUROLOGICAL EXAM:     Mental status: Awake, alert and oriented, follows commands  Speech and language: speech is not dysarthric. The patient is able to name and repeat.  Cranial nerve exam: Pupils are equal, round and reactive to light bilaterally. Visual fields are full. Extraocular muscles intact without gaze deviation. Sensation in the face is intact to light touch. Face: MILD left sided droop. Hearing to finger rub equal. Palate elevates symmetrically. Shoulder shrug is full. Tongue is midline.  Motor exam: with the exception of proximal weakness LLE, demonstrated 5/5 strength in remainder of muscles with pronation of LUE but no orbiting and full strength LLE distally. Tone is normal. No abnormal movements were seen on exam.  Sensory exam: extinction  to double simultaneous stimulation RESOLVED; no neglect  Deep tendon reflexes: 1+ and symmetric. Toes up-going bilaterally  Coordination: no ataxia   Gait: deferred given ICU level of care    Objective Data:    Labs:  Lab Results   Component Value Date/Time    PROTHROMBTM 14.4 11/19/2020 02:18 PM    INR 1.08 11/19/2020 02:18 PM      Lab Results   Component Value Date/Time    WBC 10.6 11/20/2020 02:45 AM    RBC 3.49 (L) 11/20/2020 02:45 AM    HEMOGLOBIN 10.9 (L) 11/20/2020 02:45 AM    HEMATOCRIT 33.3 (L) 11/20/2020 02:45 AM    MCV 95.4 11/20/2020 02:45 AM    MCH 31.2 11/20/2020 02:45 AM    MCHC 32.7 (L) 11/20/2020 02:45 AM    MPV 12.3 11/20/2020 02:45 AM    NEUTSPOLYS 66.50 11/20/2020 02:45 AM    LYMPHOCYTES 26.10 11/20/2020 02:45 AM    MONOCYTES 6.20 11/20/2020 02:45 AM    EOSINOPHILS 0.30 11/20/2020 02:45 AM    BASOPHILS 0.40 11/20/2020 02:45 AM      Lab Results   Component Value Date/Time    SODIUM 142 11/20/2020 02:45 AM    POTASSIUM 2.9 (L) 11/20/2020 02:45 AM    CHLORIDE 102 11/20/2020 02:45 AM    CO2 28 11/20/2020 02:45 AM    GLUCOSE 107 (H) 11/20/2020 02:45 AM    BUN 13 11/20/2020 02:45 AM    CREATININE 0.60 11/20/2020 02:45 AM      Lab Results   Component Value Date/Time    CHOLSTRLTOT 154 11/20/2020 02:45 AM    LDL 82 11/20/2020 02:45 AM    HDL 47 11/20/2020 02:45 AM    TRIGLYCERIDE 127 11/20/2020 02:45 AM       Lab Results   Component Value Date/Time    ALKPHOSPHAT 67 11/20/2020 02:45 AM    ASTSGOT 33 11/20/2020 02:45 AM    ALTSGPT 36 11/20/2020 02:45 AM    TBILIRUBIN 0.5 11/20/2020 02:45 AM        Imaging/Testing:    I interpreted and/or reviewed the patient's neuroimaging    IR-THROMBO MECHANICAL ARTERY,INIT   Final Result      1.  Occluded right distal M1 segment   2.  Mechanical thrombectomy   3.  mTICI 2 c flow      CT-FOREIGN FILM CAT SCAN   Final Result      EC-ECHOCARDIOGRAM COMPLETE W/O CONT    (Results Pending)   CT-HEAD W/O    (Results Pending)   MR-BRAIN-W/O    (Results Pending)        Assessment and Plan:    Priscila Flanagan is a 85 y.o. 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  S/p tPA at [facility] and sent to [facility] 11/19/20 for acute IR intervention via mechanical thrombectomy with TICI 2b reperfusion.  Most likely etiology is large vessel disease ie atheroembolic vs. Cardioembolic.  Significant clinical improvement 11/20.     Plan:     - ACUTE TREATMENT WITH tPA and IR INTERVENTION 11/19/20  - -160, avoid hypoperfusion  - Hold antiplatelets and any blood thinners for 24 hours status post tPA administration  --> ASA pending 24 hour stability imaging  - continue high intensity statin  - To identify stroke territory, obtain MRI brain  - Obtain a 2D echocardiogram w/ bubble study  - evaluate and treat with PT/OT/ST; physiatry consult  - stroke education  - anticipate the need for an outpatient extended cardiac event monitoring for afib (eg. Zio patch vs. Loop)     The evaluation of the patient, and recommended management, was discussed with the resident staff. I have performed a physical exam and reviewed and updated ROS and Plan today (11/20/2020). In review of yesterday's note (11/19/2020), there are no changes except as documented above.    Medhat Nolasco MD  Neurohospitalist, Acute Care Services   of Neurology

## 2020-11-20 NOTE — ASSESSMENT & PLAN NOTE
Saw her cardiologist for chest pains today  EKG, and trend troponins now  Monitor for additional chest pain, and consider stress test during this admit vs outpatient

## 2020-11-20 NOTE — THERAPY
Speech Language Pathology   Clinical Swallow Evaluation     Patient Name: Priscila Flanagan  AGE:  85 y.o., SEX:  female  Medical Record #: [id number]  Today's Date: 11/20/2020     Precautions  Precautions: Swallow Precautions ( See Comments)    Assessment    84 y/o admitted on 11/19 for R M1 stroke. She received TPN and underwent thombectomy. PMH includes HTN. Not seen by SLP at [facility] before.    Pt seen on this date for a clinical swallow evaluation. Pt A&Ox4 and agreeable to therapy. Speech was clear and only slight L sided labial weakness appreciated during the oral motor evaluation. No overt s/sx of aspiration appreciated with trials of MTL, apple sauce, pudding, soft solids, solids, or thin liquids. Slightly prolonged mastication noted with solids and she required multiple sips of water 2/2 dryness. Upon palpation, laryngeal elevation was complete and swallow trigger timely. She independently fed self and had no c/o globus. At this time, recommend that pt begin a regular- easy to chew diet with thin liquids and monitoring during meals. Dysphagia education and recommendations provided to pt and she verbalized understanding. Per RN, pt had some word finding deficits yesterday however not appreciated by SLP at this time. If speech/cognition concerns persist would recommend completing a cognitive-linguistic evaluation.    Plan    Recommend pt begin a regular-easy to chew diet with thin liquid diet with monitoring during meals and implementation of strategies (small bites/sips, up at 90* during meals, slow rate)    Recommend Speech Therapy 3 times per week until therapy goals are met for the following treatments:  Dysphagia Training and Patient / Family / Caregiver Education.    Discharge Recommendations: Anticipate that the patient will have no further speech therapy needs after discharge from the hospital       Objective       11/20/20 0908   Precautions   Precautions Swallow Precautions ( See Comments)   Vitals   O2  (LPM) 0   O2 Delivery Device None - Room Air   Pain 0 - 10 Group   Therapist Pain Assessment Post Activity Pain Same as Prior to Activity;Nurse Notified;0   Prior Living Situation   Lives with - Patient's Self Care Capacity Adult Children  (lives with son)   Prior Level Of Function   Communication Within Functional Limits   Swallow Within Functional Limits   Dentition Edentulous   Dentures Uppers;Lowers   Hearing Impaired Both Ears   Vision Within Functional Limits for Evaluation   Patient's Primary Language English   Occupation (Pre-Hospital Vocational) Retired Due To Age   Oral Motor Eval    Is Patient Able to Complete Oral Motor Eval Yes but Impaired   Labial Function   Labial Structure At Rest Within Functional Limits   Labial Vowel Production / I /, / U / Minimal  (slight reduced ROM)   Labial Sequence / I /, / U / Minimal   Lingual Function   Lingual Protrude Within Functional Limits   Lingual Retract Within Functional Limits   Lateralization No Impairment Right;No Impairment Left   Jaw   Jaw Structure At Rest Within Functional Limits   Bite ([place]) Within Functional Limits   Chew (Rotary) Within Functional Limits   Laryngeal Function   Voice Quality Within Functional Limits   Volutional Cough Within Functional Limits   Excursion Upon Swallow Complete   Oral Food Presentation   Single Swallow Mildly Thick (2) - (Nectar Thick)  Within Functional Limits   Single Swallow Thin (0) Within Functional Limits   Liquidised (3) Within Functional Limits   Pureed (4) Within Functional Limits   Soft & Bite-Sized (6) - (Dysphagia III) Within Functional Limits   Regular (7) Minimal   Self Feeding Independent   Tracheostomy   Tracheostomy  No   Dysphagia Strategies / Recommendations   Strategies / Interventions Recommended (Yes / No) Yes   Compensatory Strategies Head of Bed 90 Degrees During Eating / Drinking;Single Sips / Bites   Diet / Liquid Recommendation Regular - Easy to Chew (7);Thin (0)   Medication Administration   Whole with Liquid Wash   Therapy Interventions Dysphagia Therapy By Speech Language Pathologist   Short Term Goals   Short Term Goal # 1 Pt will consume an RG7/TN0 diet with no overt s/sx of aspiration

## 2020-11-20 NOTE — PROGRESS NOTES
Patient reporting new onset left sided chest pain, 3-4 out of 10, constant and non-radiating. Per patient, the last time she experienced this was on 6 days ago - at that time it was worse and relieved with aspirin. Patient mildly tachycardic at 105.    Discussed with Dr. Ayaal. Orders received and placed for stat EKG, BMP, magnesium, phosphorus, and troponin.

## 2020-11-20 NOTE — ASSESSMENT & PLAN NOTE
Occurred 11/19 : [facility] and given Alteplase and sent to [facility] for concern of  M1 occlusion for emergent thrombectomy.  S/P thrombectomy with improvement and minimal residual deficits  11/21: MRI brain showed right basal ganglia stroke in the internal capsule and corona radiata    --Continue atorvastatin and ASA   --Blood pressure control   --PT/OT/ST following, recommending home health for discharge   --Echocardiogram w/ bubble study done. No acute findings  --Neurology recommending cardiology consult for cardiac event monitoring. Will request formal consult on Monday as this is unable to be arranged on the weekend

## 2020-11-20 NOTE — DISCHARGE PLANNING
[facility] Transitional Care Coordination     Referral from:  Dr. Wall    Facesheet indicates: MCR/TRI    Potential Rehab Diagnosis:  M1 occlusion on the right side    Chart review indicates patient may have on going medical management and may have therapy needs to possibly meet inpatient rehab facility criteria with the goal of returning to community.    D/C support: TBD     Physiatry consultation pended per protocol.       M1 occlusion on the right side. S/P TPA/thrombectomy.  Would appreciate TX leeroy.  Waiting on additional information to determine appropriateness for acute inpatient rehabilitation. Will continue to follow.     Last Covid test date: 11-19-20 negative    Thank you for the referral.

## 2020-11-20 NOTE — PROGRESS NOTES
Patient picked up from IR 3. Report received from [facility] RN and 2 RN neuro assessment performed (see flowsheets). Per [facility], thrombectomy procedure end time 1458.    2 RN skin check complete on unit with Slime BARGER  Devices in place: upper arm blood pressure cuff.  Skin assessed under devices.  No new or confirmed pressure ulcers. Bruising to left temple, left shoulder, bilateral legs, and generalized bruising to extremities. Observed by physician.     The following interventions in place: q2 hour turns with pillows to float heels and for repositioning. Devices adjusted/repositioned.

## 2020-11-20 NOTE — ASSESSMENT & PLAN NOTE
11/20 EKG shows some ST depression but stable clinically.   Troponin normal . Electrolytes corrected  Max medical therapy as able.  Await 24 hrs for aspirin - start after stable 24 hr imaging  Continue statin.  Consider future stress test once able to advance possible medical therapy

## 2020-11-21 PROBLEM — R07.9 CHEST PAIN: Status: RESOLVED | Noted: 2020-11-19 | Resolved: 2020-11-21

## 2020-11-21 LAB
ALBUMIN SERPL BCP-MCNC: 3.5 G/DL (ref 3.2–4.9)
ALBUMIN/GLOB SERPL: 1.3 G/DL
ALP SERPL-CCNC: 74 U/L (ref 30–99)
ALT SERPL-CCNC: 32 U/L (ref 2–50)
ANION GAP SERPL CALC-SCNC: 9 MMOL/L (ref 7–16)
AST SERPL-CCNC: 23 U/L (ref 12–45)
BASOPHILS # BLD AUTO: 0.6 % (ref 0–1.8)
BASOPHILS # BLD: 0.06 K/UL (ref 0–0.12)
BILIRUB SERPL-MCNC: 0.8 MG/DL (ref 0.1–1.5)
BUN SERPL-MCNC: 12 MG/DL (ref 8–22)
CALCIUM SERPL-MCNC: 8.4 MG/DL (ref 8.5–10.5)
CHLORIDE SERPL-SCNC: 101 MMOL/L (ref 96–112)
CO2 SERPL-SCNC: 27 MMOL/L (ref 20–33)
CREAT SERPL-MCNC: 0.59 MG/DL (ref 0.5–1.4)
EOSINOPHIL # BLD AUTO: 0.36 K/UL (ref 0–0.51)
EOSINOPHIL NFR BLD: 3.9 % (ref 0–6.9)
ERYTHROCYTE [DISTWIDTH] IN BLOOD BY AUTOMATED COUNT: 50.2 FL (ref 35.9–50)
GLOBULIN SER CALC-MCNC: 2.7 G/DL (ref 1.9–3.5)
GLUCOSE SERPL-MCNC: 121 MG/DL (ref 65–99)
HCT VFR BLD AUTO: 35.6 % (ref 37–47)
HGB BLD-MCNC: 11.6 G/DL (ref 12–16)
IMM GRANULOCYTES # BLD AUTO: 0.04 K/UL (ref 0–0.11)
IMM GRANULOCYTES NFR BLD AUTO: 0.4 % (ref 0–0.9)
LYMPHOCYTES # BLD AUTO: 2.07 K/UL (ref 1–4.8)
LYMPHOCYTES NFR BLD: 22.3 % (ref 22–41)
MCH RBC QN AUTO: 30.9 PG (ref 27–33)
MCHC RBC AUTO-ENTMCNC: 32.6 G/DL (ref 33.6–35)
MCV RBC AUTO: 94.7 FL (ref 81.4–97.8)
MONOCYTES # BLD AUTO: 0.67 K/UL (ref 0–0.85)
MONOCYTES NFR BLD AUTO: 7.2 % (ref 0–13.4)
NEUTROPHILS # BLD AUTO: 6.08 K/UL (ref 2–7.15)
NEUTROPHILS NFR BLD: 65.6 % (ref 44–72)
NRBC # BLD AUTO: 0 K/UL
NRBC BLD-RTO: 0 /100 WBC
PLATELET # BLD AUTO: 152 K/UL (ref 164–446)
PMV BLD AUTO: 12.7 FL (ref 9–12.9)
POTASSIUM SERPL-SCNC: 3.2 MMOL/L (ref 3.6–5.5)
PROT SERPL-MCNC: 6.2 G/DL (ref 6–8.2)
RBC # BLD AUTO: 3.76 M/UL (ref 4.2–5.4)
SODIUM SERPL-SCNC: 137 MMOL/L (ref 135–145)
WBC # BLD AUTO: 9.3 K/UL (ref 4.8–10.8)

## 2020-11-21 PROCEDURE — 99232 SBSQ HOSP IP/OBS MODERATE 35: CPT | Performed by: PSYCHIATRY & NEUROLOGY

## 2020-11-21 PROCEDURE — 99231 SBSQ HOSP IP/OBS SF/LOW 25: CPT | Performed by: NURSE PRACTITIONER

## 2020-11-21 PROCEDURE — A9270 NON-COVERED ITEM OR SERVICE: HCPCS | Performed by: HOSPITALIST

## 2020-11-21 PROCEDURE — 770020 HCHG ROOM/CARE - TELE (206)

## 2020-11-21 PROCEDURE — 97535 SELF CARE MNGMENT TRAINING: CPT

## 2020-11-21 PROCEDURE — 80053 COMPREHEN METABOLIC PANEL: CPT

## 2020-11-21 PROCEDURE — 97165 OT EVAL LOW COMPLEX 30 MIN: CPT

## 2020-11-21 PROCEDURE — 700102 HCHG RX REV CODE 250 W/ 637 OVERRIDE(OP): Performed by: NURSE PRACTITIONER

## 2020-11-21 PROCEDURE — 36415 COLL VENOUS BLD VENIPUNCTURE: CPT

## 2020-11-21 PROCEDURE — A9270 NON-COVERED ITEM OR SERVICE: HCPCS | Performed by: NURSE PRACTITIONER

## 2020-11-21 PROCEDURE — 700102 HCHG RX REV CODE 250 W/ 637 OVERRIDE(OP): Performed by: HOSPITALIST

## 2020-11-21 PROCEDURE — 85025 COMPLETE CBC W/AUTO DIFF WBC: CPT

## 2020-11-21 RX ORDER — POTASSIUM CHLORIDE 20 MEQ/1
40 TABLET, EXTENDED RELEASE ORAL ONCE
Status: COMPLETED | OUTPATIENT
Start: 2020-11-21 | End: 2020-11-21

## 2020-11-21 RX ADMIN — ASPIRIN 325 MG: 325 TABLET, FILM COATED ORAL at 05:39

## 2020-11-21 RX ADMIN — POTASSIUM CHLORIDE 40 MEQ: 1500 TABLET, EXTENDED RELEASE ORAL at 08:07

## 2020-11-21 RX ADMIN — ATORVASTATIN CALCIUM 40 MG: 40 TABLET, FILM COATED ORAL at 17:24

## 2020-11-21 ASSESSMENT — COGNITIVE AND FUNCTIONAL STATUS - GENERAL
MOVING FROM LYING ON BACK TO SITTING ON SIDE OF FLAT BED: A LITTLE
SUGGESTED CMS G CODE MODIFIER DAILY ACTIVITY: CI
HELP NEEDED FOR BATHING: A LITTLE
CLIMB 3 TO 5 STEPS WITH RAILING: A LITTLE
SUGGESTED CMS G CODE MODIFIER MOBILITY: CK
CLIMB 3 TO 5 STEPS WITH RAILING: A LOT
DRESSING REGULAR LOWER BODY CLOTHING: A LITTLE
HELP NEEDED FOR BATHING: A LITTLE
STANDING UP FROM CHAIR USING ARMS: A LITTLE
SUGGESTED CMS G CODE MODIFIER DAILY ACTIVITY: CJ
SUGGESTED CMS G CODE MODIFIER MOBILITY: CK
DAILY ACTIVITIY SCORE: 23
STANDING UP FROM CHAIR USING ARMS: A LITTLE
MOVING TO AND FROM BED TO CHAIR: A LITTLE
MOBILITY SCORE: 17
DAILY ACTIVITIY SCORE: 22
WALKING IN HOSPITAL ROOM: A LITTLE
TURNING FROM BACK TO SIDE WHILE IN FLAT BAD: A LITTLE
MOBILITY SCORE: 18
MOVING TO AND FROM BED TO CHAIR: A LITTLE
TURNING FROM BACK TO SIDE WHILE IN FLAT BAD: A LITTLE
MOVING FROM LYING ON BACK TO SITTING ON SIDE OF FLAT BED: A LITTLE
WALKING IN HOSPITAL ROOM: A LITTLE

## 2020-11-21 ASSESSMENT — ENCOUNTER SYMPTOMS
RESPIRATORY NEGATIVE: 1
CONSTITUTIONAL NEGATIVE: 1
ABDOMINAL PAIN: 0
VOMITING: 0
PALPITATIONS: 0
WEAKNESS: 1
MUSCULOSKELETAL NEGATIVE: 1
DIZZINESS: 0
HEADACHES: 0
NAUSEA: 0

## 2020-11-21 ASSESSMENT — ACTIVITIES OF DAILY LIVING (ADL): TOILETING: INDEPENDENT

## 2020-11-21 ASSESSMENT — PAIN DESCRIPTION - PAIN TYPE
TYPE: ACUTE PAIN

## 2020-11-21 ASSESSMENT — PATIENT HEALTH QUESTIONNAIRE - PHQ9
SUM OF ALL RESPONSES TO PHQ9 QUESTIONS 1 AND 2: 0
1. LITTLE INTEREST OR PLEASURE IN DOING THINGS: NOT AT ALL
2. FEELING DOWN, DEPRESSED, IRRITABLE, OR HOPELESS: NOT AT ALL

## 2020-11-21 ASSESSMENT — GAIT ASSESSMENTS: GAIT LEVEL OF ASSIST: REFUSED

## 2020-11-21 NOTE — PROGRESS NOTES
Neurology Progress Note  Neurohospitalist Service, [facility]    Referring Physician: aJxson Iraheta M.D.    HPI: Refer to initial documented Neurology H&P, as detailed in the patient's chart.    Interval History: No acute events overnight.  Patient transferred out of ICU level of care and has no novel complaint(s) this AM.  Denies headache.    Review of systems: In addition to what is detailed in the HPI and/or updated in the interval history, all other systems reviewed and are negative.    Past Medical History:    has a past medical history of Hypertension and Post-menopause (10/22/2014). She also has no past medical history of Asthma or Type II or unspecified type diabetes mellitus without mention of complication, not stated as uncontrolled.    FHx:  family history includes Cancer in her mother and sister; Genetic Disorder in her brother; No Known Problems in her father, maternal grandfather, maternal grandmother, paternal grandfather, and paternal grandmother.    SHx:   reports that she quit smoking about 59 years ago. Her smoking use included cigarettes. She has a 2.50 pack-year smoking history. She has never used smokeless tobacco. She reports current alcohol use. She reports that she does not use drugs.    Medications:    Current Facility-Administered Medications:   •  senna-docusate (PERICOLACE or SENOKOT S) 8.6-50 MG per tablet 2 Tab, 2 Tab, Oral, BID, Stopped at 11/21/20 0600 **AND** polyethylene glycol/lytes (MIRALAX) PACKET 1 Packet, 1 Packet, Oral, QDAY PRN **AND** magnesium hydroxide (MILK OF MAGNESIA) suspension 30 mL, 30 mL, Oral, QDAY PRN **AND** bisacodyl (DULCOLAX) suppository 10 mg, 10 mg, Rectal, QDAY PRN, DAWN JenkinsO.  •  acetaminophen (Tylenol) tablet 650 mg, 650 mg, Oral, Q6HRS PRN, DAWN JenkinsO.  •  ondansetron (ZOFRAN) syringe/vial injection 4 mg, 4 mg, Intravenous, Q4HRS PRN, DAWN JenkinsO.  •  ondansetron (ZOFRAN ODT) dispertab 4 mg, 4 mg,  Oral, Q4HRS PRN, Alex Wall D.O.  •  atorvastatin (LIPITOR) tablet 40 mg, 40 mg, Oral, Q EVENING, Alex Wall D.O., 40 mg at 11/20/20 1712  •  aspirin (ASA) tablet 325 mg, 325 mg, Oral, DAILY, 325 mg at 11/21/20 0539 **OR** aspirin (ASA) chewable tab 324 mg, 324 mg, Oral, DAILY **OR** aspirin (ASA) suppository 300 mg, 300 mg, Rectal, DAILY, Alex Wall D.O.  •  labetalol (NORMODYNE/TRANDATE) injection 10 mg, 10 mg, Intravenous, Q10 MIN PRN, Alex Wall D.O., 10 mg at 11/19/20 1647    Physical Examination:     Vitals:    11/21/20 0008 11/21/20 0416 11/21/20 0806 11/21/20 0808   BP: 100/75 126/60  124/56   Pulse: 60 60 86    Resp: 16 16 18    Temp: 36.8 °C (98.3 °F) 36.8 °C (98.2 °F) 36.7 °C (98 °F)    TempSrc: Temporal Temporal Temporal    SpO2: 95% 92% 96%    Weight:       Height:           General: Patient is awake and in no acute distress  Eyes: examination of optic disks not indicated at this time  CV: RRR    NEUROLOGICAL EXAM:     Mental status: Awake, alert and oriented, follows two step commands  Speech and language: speech is non-dysarthric. The patient is able to name and repeat.  Cranial nerve exam: Pupils are equal, round and reactive to light bilaterally. Visual fields are full. Extraocular muscles intact without gaze deviation. Sensation in the face is intact to light touch. Face: symmetric. Hearing to finger rub equal. Palate elevates symmetrically. Shoulder shrug is full. Tongue is midline.  Motor exam: 5/5 strength in all muscles with mild pronator drift of LUE. Tone is normal. No abnormal movements were seen on exam.  Sensory exam: no neglect no extinction  Deep tendon reflexes: 1+ and symmetric. Toes up-going bilaterally  Coordination: no ataxia   Gait: deferred given pt. preference    Objective Data:    Labs:  Lab Results   Component Value Date/Time    PROTHROMBTM 14.4 11/19/2020 02:18 PM    INR 1.08 11/19/2020 02:18 PM      Lab Results   Component Value Date/Time    WBC 9.3  11/21/2020 03:43 AM    RBC 3.76 (L) 11/21/2020 03:43 AM    HEMOGLOBIN 11.6 (L) 11/21/2020 03:43 AM    HEMATOCRIT 35.6 (L) 11/21/2020 03:43 AM    MCV 94.7 11/21/2020 03:43 AM    MCH 30.9 11/21/2020 03:43 AM    MCHC 32.6 (L) 11/21/2020 03:43 AM    MPV 12.7 11/21/2020 03:43 AM    NEUTSPOLYS 65.60 11/21/2020 03:43 AM    LYMPHOCYTES 22.30 11/21/2020 03:43 AM    MONOCYTES 7.20 11/21/2020 03:43 AM    EOSINOPHILS 3.90 11/21/2020 03:43 AM    BASOPHILS 0.60 11/21/2020 03:43 AM      Lab Results   Component Value Date/Time    SODIUM 137 11/21/2020 03:43 AM    POTASSIUM 3.2 (L) 11/21/2020 03:43 AM    CHLORIDE 101 11/21/2020 03:43 AM    CO2 27 11/21/2020 03:43 AM    GLUCOSE 121 (H) 11/21/2020 03:43 AM    BUN 12 11/21/2020 03:43 AM    CREATININE 0.59 11/21/2020 03:43 AM      Lab Results   Component Value Date/Time    CHOLSTRLTOT 154 11/20/2020 02:45 AM    LDL 82 11/20/2020 02:45 AM    HDL 47 11/20/2020 02:45 AM    TRIGLYCERIDE 127 11/20/2020 02:45 AM       Lab Results   Component Value Date/Time    ALKPHOSPHAT 74 11/21/2020 03:43 AM    ASTSGOT 23 11/21/2020 03:43 AM    ALTSGPT 32 11/21/2020 03:43 AM    TBILIRUBIN 0.8 11/21/2020 03:43 AM        Imaging/Testing:    I interpreted and/or reviewed the patient's neuroimaging    CT-HEAD W/O   Final Result      1. Mild cerebral atrophy.      2.  Otherwise, Head CT without contrast within normal limits. No evidence of acute cerebral infarction, hemorrhage or mass lesion.      EC-ECHOCARDIOGRAM COMPLETE W/O CONT   Final Result      IR-THROMBO MECHANICAL ARTERY,INIT   Final Result      1.  Occluded right distal M1 segment   2.  Mechanical thrombectomy   3.  mTICI 2 c flow      CT-FOREIGN FILM CAT SCAN   Final Result      MR-BRAIN-W/O    (Results Pending)       Assessment and Plan:    Priscila Flanagan is a 85 y.o. 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  S/p tPA at [facility] and  sent to [facility] 11/19/20 for acute IR intervention via mechanical thrombectomy with TICI 2b reperfusion.  MRI brain demonstrating right basal ganglia stroke in the internal capsule and corona radiata.  Most likely etiology is large vessel disease ie atheroembolic vs. Cardioembolic.  Significant clinical improvement.     Plan:     - ACUTE TREATMENT WITH tPA and IR INTERVENTION 11/19/20  - goal -140; avoid hypoperfusion  - continue ASA 325mg qd for secondary stroke prevention  - continue high intensity statin  - evaluate and treat with PT/OT/ST; physiatry consult  - stroke education  - primary team to coordinate an outpatient extended cardiac event monitoring for afib (eg. Zio patch vs. Loop)  - follow up in stroke bridge clinic    No further recommendations or further studies from an acute neurological standpoint at this time. Please re-consult if you have further questions or there is a change in status.     The evaluation of the patient, and recommended management, was discussed with the resident staff. I have performed a physical exam and reviewed and updated ROS and Plan today (11/21/2020). In review of yesterday's note (11/20/2020), there are no changes except as documented above.    Medhat Nolasco MD  Neurohospitalist, Acute Care Services   of Neurology

## 2020-11-21 NOTE — FACE TO FACE
Face to Face Supporting Documentation - Home Health    The encounter with this patient was in whole or in part the primary reason for home health admission.    Date of encounter:   Patient:                    MRN:                       YOB: 2020  Priscila Flanagan  [id number]  12/18/1934     Home health to see patient for:  Skilled Nursing care for assessment, interventions & education, Physical Therapy evaluation and treatment and Speech Language Pathology evaluation and treatment    Skilled need for:  New Onset Medical Diagnosis CVA     Skilled nursing interventions to include:  Comment: PT/SLP    Homebound status evidenced by:  Need the aid of supportive devices such as crutches, canes, wheelchairs or walkers. Leaving home requires a considerable and taxing effort. There is a normal inability to leave the home.    Community Physician to provide follow up care: Radha Underwood M.D.     Optional Interventions? No      I certify the face to face encounter for this home health care referral meets the CMS requirements and the encounter/clinical assessment with the patient was, in whole, or in part, for the medical condition(s) listed above, which is the primary reason for home health care. Based on my clinical findings: the service(s) are medically necessary, support the need for home health care, and the homebound criteria are met.  I certify that this patient has had a face to face encounter by myself.  SULEMA Pollock - NPI: [id number]

## 2020-11-21 NOTE — CARE PLAN
Problem: Communication  Goal: The ability to communicate needs accurately and effectively will improve  Outcome: PROGRESSING AS EXPECTED  Note: Here for stroke. S/p thrombectomy and tPA. A&O4, NIH 0. All questions answered. Close monitoring ongoing.      Problem: Safety  Goal: Will remain free from falls  Outcome: PROGRESSING AS EXPECTED  Note: Here for stroke. S/p thrombectomy and tPA. Bed is locked in lowest position with bed alarm on. Upper side rails up. Bed alarm is on. Personal belongings and call light is within reach. Uses call light appropriately.

## 2020-11-21 NOTE — THERAPY
Occupational Therapy   Initial Evaluation     Patient Name: Priscila Flanagan  Age:  85 y.o., Sex:  female  Medical Record #: [id number]  Today's Date: 11/21/2020     Precautions  Precautions: Fall Risk, Swallow Precautions ( See Comments)  Comments: R MCA    Assessment  Patient is 85 y.o. female presents to skilled OT services following [facility] s/p thrombectomy and tpa. Pt was able to perform basic self care tasks, functional mobility and t/f's with supervision/MI using FWW, no lob noted. Pt reports she's back to her baseline and will have assist from son with IADLs if needed.     Plan    Recommend Occupational Therapy for Evaluation only     DC Equipment Recommendations: None  Discharge Recommendations: Anticipate that the patient will have no further occupational therapy needs after discharge from the hospital        Objective       11/21/20 0836   Prior Living Situation   Prior Services None   Housing / Facility 2 Story House  (stays on first level)   Steps Into Home 2  (garage)   Bathroom Set up Walk In Shower;Shower Chair   Equipment Owned 4-Wheel Walker;Tub / Shower Seat;Grab Bar(s) In Tub / Shower   Lives with - Patient's Self Care Capacity Adult Children  (son, dtr lives in town)   Comments I with ADLs/IADls baseline. Reports working from home. Has son and dtr available to assist with IADLs if needed   Prior Level of ADL Function   Self Feeding Independent   Grooming / Hygiene Independent   Bathing Independent   Dressing Independent   Toileting Independent   Prior Level of IADL Function   Medication Management Independent   Laundry Independent   Kitchen Mobility Independent   Finances Independent   Home Management Independent   Shopping Independent   Prior Level Of Mobility Independent Without Device in Community   Driving / Transportation Driving Independent   Occupation (Pre-Hospital Vocational) Retired Due To Age   Active ROM Upper Body   Active ROM Upper Body  WDL   Strength Upper Body   Upper Body Strength  WDL    Sensation Upper Body   Upper Extremity Sensation  WDL   Upper Body Muscle Tone   Upper Body Muscle Tone  WDL   Coordination Upper Body   Coordination WDL   Balance Assessment   Sitting Balance (Static) Fair +   Sitting Balance (Dynamic) Fair   Standing Balance (Static) Fair   Standing Balance (Dynamic) Fair   Weight Shift Sitting Fair   Weight Shift Standing Fair   Comments w/ FWW, no lob noted   Bed Mobility    Supine to Sit Supervised   Sit to Supine Supervised   Scooting Supervised   Rolling Supervised   ADL Assessment   Eating Independent   Grooming Supervision;Standing   Bathing   (discussed home s/u, AE and modifications)   Upper Body Dressing Supervision   Lower Body Dressing Supervision   Toileting Supervision   Functional Mobility   Sit to Stand Supervised   Bed, Chair, Wheelchair Transfer Supervised   Toilet Transfers Supervised   Comments w/FWW   Anticipated Discharge Equipment and Recommendations   DC Equipment Recommendations None

## 2020-11-21 NOTE — PROGRESS NOTES
2 RN skin note:    Bruising on left side of face, left arm, left flank, lower abdomen, and behind right knee  Left ankle swollen. Per patient, does not hurt to walk, able to bear weight, and has had two previous surgeries on left ankle. CSM intact.

## 2020-11-21 NOTE — PROGRESS NOTES
With patient’s permission (if able), completed daily phone call to designated support person,  Ervin.  Discussed patient condition and plan of care. All questions answered.  Pt was asking about her clothing that she came to the hospital in. Ervin confirmed that he had her clothes. Pt updated.

## 2020-11-21 NOTE — PROGRESS NOTES
Patient transferred to neuroscience floor with daughter and chart with 2 ACLS RNs on monitor. No medications or personal belongings.

## 2020-11-21 NOTE — THERAPY
Physical Therapy   Initial Evaluation     Patient Name: Priscila Flanagan  Age:  85 y.o., Sex:  female  Medical Record #: [id number]  Today's Date: 11/20/2020     Precautions: Fall Risk, Swallow Precautions ( See Comments)    Assessment  Patient presents with impaired coordination and decreased activity tolerance secondary to a R MCA CVA and GLF, post-tPA administration. Pt pleasant, able to complete functional activities at spv level. Demonstrates good pace with ambulation, educated about walking speed in relation to fall risk. Pt motivated to return home, will be followed by acute PT 1-2 more visits to address stairs and fall prevention strategies. Recommend HH given pt current LOF and family willingness to provide near 24/7 support, will follow.      Plan    Recommend Physical Therapy 4 times per week until therapy goals are met for the following treatments:  Bed Mobility, Equipment, Gait Training, Manual Therapy, Neuro Re-Education / Balance, Self Care/Home Evaluation, Sensory Integration Techniques, Stair Training, Therapeutic Activities and Therapeutic Exercises    DC Equipment Recommendations: Unable to determine at this time  Discharge Recommendations: Recommend home health for continued physical therapy services          Objective       11/20/20 1634   Precautions   Precautions Fall Risk;Swallow Precautions ( See Comments)   Comments R MCA   Vitals   O2 (LPM) 0   O2 Delivery Device None - Room Air  (as found)   Pain 0 - 10 Group   Therapist Pain Assessment Post Activity Pain Same as Prior to Activity;Nurse Notified  (agreeable to session)   Prior Living Situation   Prior Services None   Housing / Facility 1 Story House  (lives on ground floor of son's 2 story home)   Steps Into Home 2  (garage)   Bathroom Set up Walk In Shower   Equipment Owned 4-Wheel Walker;Grab Bar(s) In Tub / Shower   Lives with - Patient's Self Care Capacity Adult Children  (son, daughter lives in-town and also able to assist)   Comments  lives on ground floor of son's house, reports being independent prior   Prior Level of Functional Mobility   Bed Mobility Independent   Transfer Status Independent   Ambulation Independent   Distance Ambulation (Feet)   (to tolerance)   Assistive Devices Used None   Stairs Independent   Comments previously independent   History of Falls   History of Falls Yes  (admission)   Cognition    Cognition / Consciousness WDL   Level of Consciousness Alert   Comments pleasant and cooperative   Passive ROM Lower Body   Passive ROM Lower Body WDL   Strength Lower Body   Lower Body Strength  X   Comments generalized weakness, LLE 4/5, RLE 5/5   Sensation Lower Body   Lower Extremity Sensation   WDL   Comments denies n/t   Coordination Lower Body    Coordination Lower Body  X   Heel To Shin Right Impaired   Heel To Shin Left Impaired   Balance Assessment   Sitting Balance (Static) Fair +   Sitting Balance (Dynamic) Fair   Standing Balance (Static) Fair   Standing Balance (Dynamic) Fair   Weight Shift Sitting Fair   Weight Shift Standing Fair   Comments No LOB in session, good control throughout   Gait Analysis   Gait Level Of Assist Supervised   Assistive Device Front Wheel Walker   Distance (Feet) 400   # of Times Distance was Traveled 1   Weight Bearing Status full   Vision Deficits Impacting Mobility denies   Comments good control and good pace with ambulation   Bed Mobility    Supine to Sit Supervised   Sit to Supine Supervised   Functional Mobility   Sit to Stand Supervised  (with FWW)   Edema / Skin Assessment   Edema / Skin  X   Comments notable brusing along L side of back, brusing under L eye from fall   Patient / Family Goals    Patient / Family Goal #1 to go home   Short Term Goals    Short Term Goal # 1 Pt will be able to negotiate 2 steps with spv and no AD within 6 visits to ensure entry/exit into house.   Short Term Goal # 2 Pt will be able to ambulate 150 ft with no AD and spv within 6 visits to ensure mobility at  home.   Education Group   Role of Physical Therapist Patient Response Patient;Acceptance;Explanation;Verbal Demonstration   Problem List    Problems Impaired Balance;Impaired Coordination;Decreased Activity Tolerance;Safety Awareness Deficits / Cognition

## 2020-11-21 NOTE — CARE PLAN
Problem: Safety  Goal: Will remain free from falls  Outcome: PROGRESSING AS EXPECTED  Note: Place call light within reach of patient. Bed low and locked. Educate patient to call for assistance prior to getting out of bed or chair.      Problem: Knowledge Deficit  Goal: Knowledge of the prescribed therapeutic regimen will improve  Outcome: PROGRESSING AS EXPECTED  Note: Educate patient on disease process. Include patient in plan of care. Communicate with interdisciplinary team members about care plan.

## 2020-11-21 NOTE — PROGRESS NOTES
Monitor Summary: SR 68-83, CA .16, QRS .08, QT .36 with rare PVC & trigem per strip from monitor room

## 2020-11-21 NOTE — PROGRESS NOTES
[facility] Medicine Daily Progress Note    Date of Service  11/21/2020    Chief Complaint  Fall at home and left sided weakness    Hospital Course  85 y.o. female with a history of hypertension that while eating lunch 11/19 dropped her soup and went to pick it up and fell down onto the floor unable to get up.  She had texted her son who came over and noticed an issue and called paramedics.  Paramedics were concerned of acute stroke with left-sided deficits and sent the patient to [facility].  At [facility] was concerned of acute stroke she was given alteplase and found to have an M1 occlusion on the right side.  She was then sent to [facility] for need of emergent thrombectomy.  She underwent thrombectomy here at [facility] and had near resolve of her left-sided deficits.    Interval Problem Update  Awake, alert and oriented. Equal strength. Clear speech. No sensory deficits. Referral placed for home health care per PT recommendations     Consultants/Specialty  Neurology  Intensivist    Code Status  Full Code    Disposition  Medically cleared for discharge pending arrangement of home health for PT/SLP needs    Review of Systems  Review of Systems   Constitutional: Negative.    Respiratory: Negative.    Cardiovascular: Negative for chest pain and palpitations.   Gastrointestinal: Negative for abdominal pain, nausea and vomiting.   Musculoskeletal: Negative.    Neurological: Positive for weakness. Negative for dizziness and headaches.        Physical Exam  Temp:  [36.7 °C (98 °F)-37.3 °C (99.1 °F)] 36.8 °C (98.2 °F)  Pulse:  [60-87] 85  Resp:  [14-23] 17  BP: (100-149)/(53-76) 129/59  SpO2:  [91 %-97 %] 96 %    Physical Exam  Vitals signs reviewed.   Constitutional:       Appearance: Normal appearance. She is not diaphoretic.   HENT:      Head: Normocephalic and atraumatic.      Nose: Nose normal.      Mouth/Throat:      Mouth: Mucous membranes are moist.      Pharynx: No oropharyngeal exudate.    Eyes:      General: No scleral icterus.        Right eye: No discharge.         Left eye: No discharge.      Extraocular Movements: Extraocular movements intact.      Conjunctiva/sclera: Conjunctivae normal.      Pupils: Pupils are equal, round, and reactive to light.   Neck:      Musculoskeletal: No muscular tenderness.   Cardiovascular:      Rate and Rhythm: Normal rate and regular rhythm.      Pulses:           Radial pulses are 2+ on the right side and 2+ on the left side.        Dorsalis pedis pulses are 2+ on the right side and 2+ on the left side.      Heart sounds: No murmur.   Pulmonary:      Effort: Pulmonary effort is normal. No respiratory distress.      Breath sounds: No wheezing or rales.   Abdominal:      General: There is no distension.      Palpations: Abdomen is soft.      Tenderness: There is no abdominal tenderness. There is no guarding.   Musculoskeletal:         General: No swelling or tenderness.   Lymphadenopathy:      Cervical: No cervical adenopathy.   Skin:     General: Skin is dry.      Coloration: Skin is not jaundiced or pale.      Findings: Bruising present.   Neurological:      Mental Status: She is alert and oriented to person, place, and time. Mental status is at baseline.      Sensory: No sensory deficit.      Motor: Weakness present.   Psychiatric:         Mood and Affect: Mood normal.         Behavior: Behavior normal.         Fluids    Intake/Output Summary (Last 24 hours) at 11/21/2020 1433  Last data filed at 11/21/2020 0400  Gross per 24 hour   Intake 480 ml   Output 0 ml   Net 480 ml       Laboratory  Recent Labs     11/19/20  1418 11/20/20  0245 11/21/20  0343   WBC 14.7* 10.6 9.3   RBC 4.45 3.49* 3.76*   HEMOGLOBIN 14.2 10.9* 11.6*   HEMATOCRIT 43.2 33.3* 35.6*   MCV 97.1 95.4 94.7   MCH 31.9 31.2 30.9   MCHC 32.9* 32.7* 32.6*   RDW 51.5* 50.6* 50.2*   PLATELETCT 174 168 152*   MPV 11.9 12.3 12.7     Recent Labs     11/20/20  0245 11/20/20  1149 11/21/20  0343   SODIUM  142 133* 137   POTASSIUM 2.9* 5.4 3.2*   CHLORIDE 102 99 101   CO2 28 25 27   GLUCOSE 107* 136* 121*   BUN 13 12 12   CREATININE 0.60 0.67 0.59   CALCIUM 8.6 8.7 8.4*     Recent Labs     11/19/20  1418   APTT 35.1   INR 1.08         Recent Labs     11/20/20  0245   TRIGLYCERIDE 127   HDL 47   LDL 82       Imaging  MR-BRAIN-W/O   Final Result         1. Age-related cerebral atrophy.      2. Mild periventricular white matter changes consistent with chronic microvascular ischemic gliosis.      3. Acute thick bandlike area of infarction extending from the right frontal periventricular white matter into the right lateral basal ganglia. Additional punctate foci of acute cortical infarction are noted in the right parasylvian region.      CT-HEAD W/O   Final Result      1. Mild cerebral atrophy.      2.  Otherwise, Head CT without contrast within normal limits. No evidence of acute cerebral infarction, hemorrhage or mass lesion.      EC-ECHOCARDIOGRAM COMPLETE W/O CONT   Final Result      IR-THROMBO MECHANICAL ARTERY,INIT   Final Result      1.  Occluded right distal M1 segment   2.  Mechanical thrombectomy   3.  mTICI 2 c flow      CT-FOREIGN FILM CAT SCAN   Final Result           Assessment/Plan  * CVA (cerebral vascular accident) (HCC)  Assessment & Plan  Occurred 11/19 : [facility] and given Alteplase and sent to [facility] for concern of  M1 occlusion for emergent thrombectomy.  S/P thrombectomy with improvement and minimal residual deficits  11/21: MRI brain showed right basal ganglia stroke in the internal capsule and corona radiata    --Continue atorvastatin and ASA   --Blood pressure control   --PT/OT/ST following, recommending home health for discharge   --Echocardiogram w/ bubble study done. No acute findings  --Neurology recommending cardiology consult for cardiac event monitoring. Will request formal consult on Monday as this is unable to be arranged on the weekend       Hypokalemia  Assessment &  Plan  Down to 3.2 this morning  Repleted PO   Monitor on BMP tomorrow       Essential hypertension  Assessment & Plan  SBPs 100-120s   Hold atenolol and HCTZ  Monitor VS per policy        VTE prophylaxis: lovenox      I have performed a physical exam and reviewed and updated ROS and Plan today (11/21/2020). In review of yesterday's note (11/20/2020), there are no changes except as documented above.

## 2020-11-22 VITALS
HEIGHT: 64 IN | DIASTOLIC BLOOD PRESSURE: 65 MMHG | BODY MASS INDEX: 24.28 KG/M2 | RESPIRATION RATE: 15 BRPM | SYSTOLIC BLOOD PRESSURE: 135 MMHG | WEIGHT: 142.2 LBS | HEART RATE: 80 BPM | OXYGEN SATURATION: 96 % | TEMPERATURE: 98.1 F

## 2020-11-22 PROBLEM — D64.9 ANEMIA: Status: ACTIVE | Noted: 2020-11-22

## 2020-11-22 LAB
ANION GAP SERPL CALC-SCNC: 7 MMOL/L (ref 7–16)
BUN SERPL-MCNC: 9 MG/DL (ref 8–22)
CALCIUM SERPL-MCNC: 8.3 MG/DL (ref 8.5–10.5)
CHLORIDE SERPL-SCNC: 103 MMOL/L (ref 96–112)
CO2 SERPL-SCNC: 25 MMOL/L (ref 20–33)
CREAT SERPL-MCNC: 0.54 MG/DL (ref 0.5–1.4)
ERYTHROCYTE [DISTWIDTH] IN BLOOD BY AUTOMATED COUNT: 49.6 FL (ref 35.9–50)
GLUCOSE SERPL-MCNC: 108 MG/DL (ref 65–99)
HCT VFR BLD AUTO: 32.1 % (ref 37–47)
HGB BLD-MCNC: 10.5 G/DL (ref 12–16)
MCH RBC QN AUTO: 30.9 PG (ref 27–33)
MCHC RBC AUTO-ENTMCNC: 32.7 G/DL (ref 33.6–35)
MCV RBC AUTO: 94.4 FL (ref 81.4–97.8)
PLATELET # BLD AUTO: 150 K/UL (ref 164–446)
PMV BLD AUTO: 11.9 FL (ref 9–12.9)
POTASSIUM SERPL-SCNC: 3.3 MMOL/L (ref 3.6–5.5)
RBC # BLD AUTO: 3.4 M/UL (ref 4.2–5.4)
SODIUM SERPL-SCNC: 135 MMOL/L (ref 135–145)
WBC # BLD AUTO: 8.4 K/UL (ref 4.8–10.8)

## 2020-11-22 PROCEDURE — A9270 NON-COVERED ITEM OR SERVICE: HCPCS | Performed by: NURSE PRACTITIONER

## 2020-11-22 PROCEDURE — A9270 NON-COVERED ITEM OR SERVICE: HCPCS | Performed by: HOSPITALIST

## 2020-11-22 PROCEDURE — 80048 BASIC METABOLIC PNL TOTAL CA: CPT

## 2020-11-22 PROCEDURE — 85027 COMPLETE CBC AUTOMATED: CPT

## 2020-11-22 PROCEDURE — 36415 COLL VENOUS BLD VENIPUNCTURE: CPT

## 2020-11-22 PROCEDURE — 700102 HCHG RX REV CODE 250 W/ 637 OVERRIDE(OP): Performed by: HOSPITALIST

## 2020-11-22 PROCEDURE — 700102 HCHG RX REV CODE 250 W/ 637 OVERRIDE(OP): Performed by: NURSE PRACTITIONER

## 2020-11-22 RX ORDER — POTASSIUM CHLORIDE 20 MEQ/1
40 TABLET, EXTENDED RELEASE ORAL ONCE
Status: COMPLETED | OUTPATIENT
Start: 2020-11-22 | End: 2020-11-22

## 2020-11-22 RX ORDER — ATORVASTATIN CALCIUM 40 MG/1
40 TABLET, FILM COATED ORAL EVERY EVENING
Qty: 30 TAB | Refills: 3 | Status: ON HOLD | OUTPATIENT
Start: 2020-11-22 | End: 2020-12-20 | Stop reason: SDUPTHER

## 2020-11-22 RX ORDER — ASPIRIN 325 MG
325 TABLET ORAL DAILY
Qty: 100 TAB | Refills: 3 | Status: SHIPPED | OUTPATIENT
Start: 2020-11-23 | End: 2020-12-19

## 2020-11-22 RX ADMIN — POTASSIUM CHLORIDE 40 MEQ: 1500 TABLET, EXTENDED RELEASE ORAL at 08:19

## 2020-11-22 RX ADMIN — ASPIRIN 325 MG: 325 TABLET, FILM COATED ORAL at 05:21

## 2020-11-22 ASSESSMENT — FIBROSIS 4 INDEX: FIB4 SCORE: 2.3

## 2020-11-22 ASSESSMENT — ENCOUNTER SYMPTOMS
CONSTITUTIONAL NEGATIVE: 1
HEADACHES: 0
PALPITATIONS: 0
MUSCULOSKELETAL NEGATIVE: 1
DIZZINESS: 0
WEAKNESS: 1
NAUSEA: 0
ABDOMINAL PAIN: 0
RESPIRATORY NEGATIVE: 1
VOMITING: 0

## 2020-11-22 NOTE — DISCHARGE SUMMARY
Discharge Summary    CHIEF COMPLAINT ON ADMISSION  Fall     Reason for Admission  Stroke     Admission Date  11/19/2020    CODE STATUS  Full Code    HPI & HOSPITAL COURSE  85 y.o. female with a history of hypertension that while eating lunch 11/19 dropped her soup and went to pick it up and fell down onto the floor unable to get up.  She had texted her son who came over and noticed an issue and called paramedics.  Paramedics were concerned of acute stroke with left-sided deficits and sent the patient to [facility].  At [facility] was concerned of acute stroke she was given alteplase and found to have an M1 occlusion on the right side.  She was then sent to [facility] for need of emergent thrombectomy.  She underwent thrombectomy here at [facility] and had near resolve of her left-sided deficits.  Neurology was consulted and provided recommendations. Repeat MRI demonstrating right basal ganglia stroke in the internal capsule and corona radiata. Neurology cleared patient for discharge home with follow-[facility]. An extended cardiac event monitor was recommended, however this was unable to be arranged on the weekend. Patient was therefore instructed to follow-up with PCP and obtain a cardiology consult for cardiac event monitor as soon as possible. PT/OT/SLP assessed on the floor and recommended home with home health care services, but this was also unable to be arranged on the weekend and patient did not want to wait 24 hours for this to be set up. Fortunately, her son and daughter agreed to provide 24/7 care in the immediate time following discharge and CM would follow home health referral tomorrow.       Therefore, she is discharged in guarded and stable condition to home with close outpatient follow-up.      Discharge Date  11/22/2020    FOLLOW UP ITEMS POST DISCHARGE  Cardiology referral for cardiac event monitor   [facility]   PCP follow-up in 5 days      DISCHARGE DIAGNOSES  Principal Problem:    CVA (cerebral vascular accident) (HCC) POA: Unknown  Active Problems:    Essential hypertension POA: Unknown    Hypokalemia POA: Unknown    Anemia POA: Unknown  Resolved Problems:    Chest pain POA: Unknown      FOLLOW UP  No future appointments.  Radha Underwood M.D.  [address] [address]  [place] [address]  [phone]    Schedule an appointment as soon as possible for a visit        MEDICATIONS ON DISCHARGE     Medication List      START taking these medications      Instructions   atorvastatin 40 MG Tabs  Commonly known as: LIPITOR   Take 1 Tab by mouth every evening.  Dose: 40 mg        CHANGE how you take these medications      Instructions   aspirin 325 MG Tabs  Start taking on: November 23, 2020  What changed:   · medication strength  · how much to take  Commonly known as: ASA   Take 1 Tab by mouth every day.  Dose: 325 mg        CONTINUE taking these medications      Instructions   hydroCHLOROthiazide 25 MG Tabs  Commonly known as: HYDRODIURIL   Take 1 Tab by mouth every day.  Dose: 25 mg        STOP taking these medications    atenolol 50 MG Tabs  Commonly known as: TENORMIN     Tylenol PM Extra Strength  MG Tabs  Generic drug: diphenhydrAMINE-APAP (sleep)            Allergies  No Known Allergies    DIET  Orders Placed This Encounter   Procedures   • Diet Order Diet: Level 7 - Easy to Chew; Liquid level: Level 0 - Thin     Standing Status:   Standing     Number of Occurrences:   1     Order Specific Question:   Diet:     Answer:   Level 7 - Easy to Chew [22]     Order Specific Question:   Liquid level     Answer:   Level 0 - Thin       ACTIVITY  As tolerated.  Weight bearing as tolerated    CONSULTATIONS  Neurology   IR    PROCEDURES  11/19: Right MCA thrombectomy     LABORATORY  Lab Results   Component Value Date    SODIUM 135 11/22/2020    POTASSIUM 3.3 (L) 11/22/2020    CHLORIDE 103 11/22/2020    CO2 25 11/22/2020    GLUCOSE 108 (H)  11/22/2020    BUN 9 11/22/2020    CREATININE 0.54 11/22/2020        Lab Results   Component Value Date    WBC 8.4 11/22/2020    HEMOGLOBIN 10.5 (L) 11/22/2020    HEMATOCRIT 32.1 (L) 11/22/2020    PLATELETCT 150 (L) 11/22/2020        Total time of the discharge process exceeds 45 minutes.

## 2020-11-22 NOTE — DISCHARGE INSTRUCTIONS
Discharge Instructions    Discharged to home by car with relative. Discharged via wheelchair, hospital escort: Yes.  Special equipment needed: Not Applicable    Be sure to schedule a follow-up appointment with your primary care doctor or any specialists as instructed.     Discharge Plan:   Diet Plan: Discussed  Activity Level: Discussed  Confirmed Follow up Appointment: No (Comments)  Confirmed Symptoms Management: Discussed  Medication Reconciliation Updated: Yes    I understand that a diet low in cholesterol, fat, and sodium is recommended for good health. Unless I have been given specific instructions below for another diet, I accept this instruction as my diet prescription.   Other diet: regular    Special Instructions: None    · Is patient discharged on Warfarin / Coumadin?   No     Depression / Suicide Risk    As you are discharged from this [facility] facility, it is important to learn how to keep safe from harming yourself.    Recognize the warning signs:  · Abrupt changes in personality, positive or negative- including increase in energy   · Giving away possessions  · Change in eating patterns- significant weight changes-  positive or negative  · Change in sleeping patterns- unable to sleep or sleeping all the time   · Unwillingness or inability to communicate  · Depression  · Unusual sadness, discouragement and loneliness  · Talk of wanting to die  · Neglect of personal appearance   · Rebelliousness- reckless behavior  · Withdrawal from people/activities they love  · Confusion- inability to concentrate     If you or a loved one observes any of these behaviors or has concerns about self-harm, here's what you can do:  · Talk about it- your feelings and reasons for harming yourself  · Remove any means that you might use to hurt yourself (examples: pills, rope, extension cords, firearm)  · Get professional help from the community (Mental Health, Substance Abuse, psychological counseling)  · Do not be  alone:Call your Safe Contact- someone whom you trust who will be there for you.  · Call your local CRISIS HOTLINE 513-1265 or 627-060-0285  · Call your local Children's Mobile Crisis Response Team [place] (619) 866-9690 or www.Hireology  · Call the toll free National Suicide Prevention Hotlines   · National Suicide Prevention Lifeline 487-840-FBWO (1469)  · WhereNet Line Network 800-SUICIDE (505-9478)      Alteplase Treatment for Ischemic Stroke    Alteplase is a medicine that can dissolve blood clots. An ischemic stroke is caused by clots that block blood flow to the brain. Alteplase can help treat a stroke if it is given very shortly after stroke symptoms begin.  Before giving this medicine, your doctor will decide if the medicine may work for you based on:  · Your age.  · Your condition.  · Other factors.  Tell your doctor about:  · Any allergies you have.  · All medicines you are taking.  · Any blood disorders you have.  · Any medical conditions you have.  · Any bleeding in the last month, including stomach or vaginal bleeding.  · Any surgeries or procedures you have had.  · Whether you are pregnant or may be pregnant.  · When your symptoms started.  What are the risks?  Generally, this is a safe treatment. However, problems may happen, including:  · Bleeding.  · Allergic reactions to the medicine.  What happens before the procedure?  · Your doctor will do an exam.  · Your doctor will check your:  ? Blood pressure.  ? Heart rate.  ? Breathing.  · Medicines may be given to adjust your blood pressure, if needed.  · You may have tests, such as:  ? Blood tests.  ? A head scan (CT scan).  What happens during the procedure?  · An IV tube will be put into one of your veins.  · Alteplase will be given to you through the IV tube.  · In some cases, this medicine may be given directly to the affected area through a thin tube (catheter). This is usually put in at the top of your leg.  · Your health care  "team will closely watch your:  ? Blood pressure.  ? Heart rate.  ? Breathing.  · Your doctor will check often to see how well the medicine is working.  · If the medicine causes bleeding, it will be stopped. Another treatment will be started.  What happens after the procedure?  · You will be watched closely in the ICU or the stroke unit.  · You will be assessed by several specialists.  · If you had a catheter, you may have:  ? Bruising.  ? Soreness.  ? Swelling.  · It may take days, weeks, or months to fully see how well your body responded to the treatment.  Follow these instructions in the hospital:  Medicines  · Take over-the-counter and prescription medicines only as told by your doctor.  Activity  · Do not get out of bed without help. This is for your safety.  · Limit activity after your treatment.  · Do stroke rehab programs as told by your doctor.  General instructions  · Tell a nurse or doctor right away if you have any bleeding, bruising or injuries.  · Use a soft-bristled toothbrush. Brush your teeth gently.  Get help right away if you have:  · Blood in your vomit, stool, or urine.  · A serious fall or accident, or you hit your head.  · Symptoms of an allergic reaction such as rash or difficulty breathing.  · Any signs of a stroke. \"BE FAST\" is an easy way to remember the main warning signs.  ? B - Balance. Signs are dizziness, sudden trouble walking, or loss of balance.  ? E - Eyes. Signs are trouble seeing or a sudden change in how you see.  ? F - Face. Signs are sudden weakness or loss of feeling in the face, or the face or eyelid drooping on one side.  ? A - Arms. Signs are weakness or loss of feeling in an arm. This happens suddenly and usually on one side of the body.  ? S - Speech. Signs are sudden trouble speaking, slurred speech, or trouble understanding what people say.  ? T - Time. Time to call emergency services. Write down what time symptoms started.  · Other signs of a stroke. This may " include:  ? A sudden, very bad headache with no known cause.  ? Feeling sick to your stomach (nausea).  ? Throwing up.  ? Uncontrolled, jerky movements (seizure).  These symptoms may represent a serious problem that is an emergency. Do not wait to see if the symptoms will go away. Get medical help right away.   Summary  · Alteplase is a medicine that can break up blood clots. Blood clots can cause a stroke.  · This medicine may help if you get it as soon as possible after your stroke symptoms start.  · Get help right away if you are showing signs of increased bleeding or are having symptoms of stroke.  This information is not intended to replace advice given to you by your health care provider. Make sure you discuss any questions you have with your health care provider.  Document Released: 04/03/2012 Document Revised: 04/07/2020 Document Reviewed: 01/16/2019  Elsevier Patient Education © 2020 [employer]    Stroke Prevention  Some medical conditions and lifestyle choices can lead to a higher risk for a stroke. You can help to prevent a stroke by making nutrition, lifestyle, and other changes.  What nutrition changes can be made?    · Eat healthy foods.  ? Choose foods that are high in fiber. These include:  § Fresh fruits.  § Fresh vegetables.  § Whole grains.  ? Eat at least 5 or more servings of fruits and vegetables each day. Try to fill half of your plate at each meal with fruits and vegetables.  ? Choose lean protein foods. These include:  § Lowfat (lean) cuts of meat.  § Chicken without skin.  § Fish.  § Tofu.  § Beans.  § Nuts.  ? Eat low-fat dairy products.  ? Avoid foods that:  § Are high in salt (sodium).  § Have saturated fat.  § Have trans fat.  § Have cholesterol.  § Are processed.  § Are premade.  · Follow eating guidelines as told by your doctor. These may include:  ? Reducing how many calories you eat and drink each day.  ? Limiting how much salt you eat or drink each day to 1,500 milligrams  (mg).  ? Using only healthy fats for cooking. These include:  § Olive oil.  § Canola oil.  § Sunflower oil.  ? Counting how many carbohydrates you eat and drink each day.  What lifestyle changes can be made?  · Try to stay at a healthy weight. Talk to your doctor about what a good weight is for you.  · Get at least 30 minutes of moderate physical activity at least 5 days a week. This can include:  ? Fast walking.  ? Biking.  ? Swimming.  · Do not use any products that have nicotine or tobacco. This includes cigarettes and e-cigarettes. If you need help quitting, ask your doctor. Avoid being around tobacco smoke in general.  · Limit how much alcohol you drink to no more than 1 drink a day for nonpregnant women and 2 drinks a day for men. One drink equals 12 oz of beer, 5 oz of wine, or 1½ oz of hard liquor.  · Do not use drugs.  · Avoid taking birth control pills. Talk to your doctor about the risks of taking birth control pills if:  ? You are over 35 years old.  ? You smoke.  ? You get migraines.  ? You have had a blood clot.  What other changes can be made?  · Manage your cholesterol.  ? It is important to eat a healthy diet.  ? If your cholesterol cannot be managed through your diet, you may also need to take medicines. Take medicines as told by your doctor.  · Manage your diabetes.  ? It is important to eat a healthy diet and to exercise regularly.  ? If your blood sugar cannot be managed through diet and exercise, you may need to take medicines. Take medicines as told by your doctor.  · Control your high blood pressure (hypertension).  ? Try to keep your blood pressure below 130/80. This can help lower your risk of stroke.  ? It is important to eat a healthy diet and to exercise regularly.  ? If your blood pressure cannot be managed through diet and exercise, you may need to take medicines. Take medicines as told by your doctor.  ? Ask your doctor if you should check your blood pressure at home.  ? Have your  "blood pressure checked every year. Do this even if your blood pressure is normal.  · Talk to your doctor about getting checked for a sleep disorder. Signs of this can include:  ? Snoring a lot.  ? Feeling very tired.  · Take over-the-counter and prescription medicines only as told by your doctor. These may include aspirin or blood thinners (antiplatelets or anticoagulants).  · Make sure that any other medical conditions you have are managed.  Where to find more information  · American Stroke Association: www.strokeassociation.org  · National Stroke Association: www.stroke.org  Get help right away if:  · You have any symptoms of stroke. \"BE FAST\" is an easy way to remember the main warning signs:  ? B - Balance. Signs are dizziness, sudden trouble walking, or loss of balance.  ? E - Eyes. Signs are trouble seeing or a sudden change in how you see.  ? F - Face. Signs are sudden weakness or loss of feeling of the face, or the face or eyelid drooping on one side.  ? A - Arms. Signs are weakness or loss of feeling in an arm. This happens suddenly and usually on one side of the body.  ? S - Speech. Signs are sudden trouble speaking, slurred speech, or trouble understanding what people say.  ? T - Time. Time to call emergency services. Write down what time symptoms started.  · You have other signs of stroke, such as:  ? A sudden, very bad headache with no known cause.  ? Feeling sick to your stomach (nausea).  ? Throwing up (vomiting).  ? Jerky movements you cannot control (seizure).  These symptoms may represent a serious problem that is an emergency. Do not wait to see if the symptoms will go away. Get medical help right away. Call your local emergency services (911 in the [place] Do not drive yourself to the hospital.  Summary  · You can prevent a stroke by eating healthy, exercising, not smoking, drinking less alcohol, and treating other health problems, such as diabetes, high blood pressure, or high cholesterol.  · Do " not use any products that contain nicotine or tobacco, such as cigarettes and e-cigarettes.  · Get help right away if you have any signs or symptoms of a stroke.  This information is not intended to replace advice given to you by your health care provider. Make sure you discuss any questions you have with your health care provider.  Document Released: 06/18/2013 Document Revised: 02/13/2020 Document Reviewed: 03/21/2018  [employer] Patient Education © 2020 [employer]      Discharge instructions:    DIET: Resume home diet previous to admission    ACTIVITY: No strenuous excercise.    DIAGNOSIS: Cerebrovascular Accident     FOLLOW-UP: Please follow-up with your Primary Care Provider this week to obtain a cardiology referral for a continuous cardiac monitor and arrangement of Home Health Care.     MEDICATIONS: Please do not resume Atenolol until further discussion with your Primary Care Provider, since your blood pressures have been on the lower end of normal without this medication.        Return to ER if fever greater than 38 degree Celsius or 100.4 degree Fahrenheit, worsening pain, chest pain at rest or associated with exertion, worsening shortness of breath, bloody coughs, bloody bowel movement, severe unrelenting abdominal pain, focal weakness.    JUAN DIEGO Pollock.

## 2020-11-22 NOTE — PROGRESS NOTES
[facility] Medicine Daily Progress Note    Date of Service  11/22/2020    Chief Complaint  Fall at home and left sided weakness    Hospital Course  85 y.o. female with a history of hypertension that while eating lunch 11/19 dropped her soup and went to pick it up and fell down onto the floor unable to get up.  She had texted her son who came over and noticed an issue and called paramedics.  Paramedics were concerned of acute stroke with left-sided deficits and sent the patient to [facility].  At [facility] was concerned of acute stroke she was given alteplase and found to have an M1 occlusion on the right side.  She was then sent to [facility] for need of emergent thrombectomy.  She underwent thrombectomy here at [facility] and had near resolve of her left-sided deficits.    Interval Problem Update  Awake, alert. Stable neuro exam. Denies any complaints. She is agreeable to home health for PT needs. Also says that she has arranged for her daughter to come live with her upon discharge until she feels back to her baseline level of strength      Consultants/Specialty  Neurology  Intensivist    Code Status  Full Code    Disposition  Medically cleared for discharge pending arrangement of home health for PT/SLP needs    Review of Systems  Review of Systems   Constitutional: Negative.    Respiratory: Negative.    Cardiovascular: Negative for chest pain and palpitations.   Gastrointestinal: Negative for abdominal pain, nausea and vomiting.   Musculoskeletal: Negative.    Neurological: Positive for weakness. Negative for dizziness and headaches.        Physical Exam  Temp:  [36.4 °C (97.5 °F)-37.2 °C (99 °F)] 36.6 °C (97.8 °F)  Pulse:  [67-91] 67  Resp:  [16-18] 16  BP: (111-129)/(59-75) 121/60  SpO2:  [93 %-95 %] 95 %    Physical Exam  Vitals signs and nursing note reviewed.   Constitutional:       Appearance: Normal appearance. She is not diaphoretic.   HENT:      Head: Normocephalic and atraumatic.       Nose: Nose normal.      Mouth/Throat:      Mouth: Mucous membranes are moist.      Pharynx: No oropharyngeal exudate.   Eyes:      General: No scleral icterus.        Right eye: No discharge.         Left eye: No discharge.      Extraocular Movements: Extraocular movements intact.      Conjunctiva/sclera: Conjunctivae normal.      Pupils: Pupils are equal, round, and reactive to light.   Neck:      Musculoskeletal: No muscular tenderness.   Cardiovascular:      Rate and Rhythm: Normal rate and regular rhythm.      Heart sounds: No murmur.   Pulmonary:      Effort: Pulmonary effort is normal. No respiratory distress.      Breath sounds: No wheezing or rales.   Abdominal:      General: There is no distension.      Palpations: Abdomen is soft.      Tenderness: There is no abdominal tenderness. There is no guarding.   Musculoskeletal:         General: No swelling or tenderness.      Right lower leg: No edema.      Left lower leg: No edema.   Lymphadenopathy:      Cervical: No cervical adenopathy.   Skin:     General: Skin is warm and dry.      Coloration: Skin is not jaundiced or pale.      Findings: Bruising (BUE, left periorbital, left breast ) present.      Comments: Thin, fragile    Neurological:      Mental Status: She is alert and oriented to person, place, and time. Mental status is at baseline.      Sensory: No sensory deficit.      Motor: Weakness present.   Psychiatric:         Mood and Affect: Mood normal.         Behavior: Behavior normal.         Fluids  No intake or output data in the 24 hours ending 11/22/20 1106    Laboratory  Recent Labs     11/20/20  0245 11/21/20  0343 11/22/20  0337   WBC 10.6 9.3 8.4   RBC 3.49* 3.76* 3.40*   HEMOGLOBIN 10.9* 11.6* 10.5*   HEMATOCRIT 33.3* 35.6* 32.1*   MCV 95.4 94.7 94.4   MCH 31.2 30.9 30.9   MCHC 32.7* 32.6* 32.7*   RDW 50.6* 50.2* 49.6   PLATELETCT 168 152* 150*   MPV 12.3 12.7 11.9     Recent Labs     11/20/20  1149 11/21/20  0343 11/22/20  0337   SODIUM 133*  137 135   POTASSIUM 5.4 3.2* 3.3*   CHLORIDE 99 101 103   CO2 25 27 25   GLUCOSE 136* 121* 108*   BUN 12 12 9   CREATININE 0.67 0.59 0.54   CALCIUM 8.7 8.4* 8.3*     Recent Labs     11/19/20  1418   APTT 35.1   INR 1.08         Recent Labs     11/20/20  0245   TRIGLYCERIDE 127   HDL 47   LDL 82       Imaging  MR-BRAIN-W/O   Final Result         1. Age-related cerebral atrophy.      2. Mild periventricular white matter changes consistent with chronic microvascular ischemic gliosis.      3. Acute thick bandlike area of infarction extending from the right frontal periventricular white matter into the right lateral basal ganglia. Additional punctate foci of acute cortical infarction are noted in the right parasylvian region.      CT-HEAD W/O   Final Result      1. Mild cerebral atrophy.      2.  Otherwise, Head CT without contrast within normal limits. No evidence of acute cerebral infarction, hemorrhage or mass lesion.      EC-ECHOCARDIOGRAM COMPLETE W/O CONT   Final Result      IR-THROMBO MECHANICAL ARTERY,INIT   Final Result      1.  Occluded right distal M1 segment   2.  Mechanical thrombectomy   3.  mTICI 2 c flow      CT-FOREIGN FILM CAT SCAN   Final Result           Assessment/Plan  * CVA (cerebral vascular accident) (HCC)  Assessment & Plan  Occurred 11/19 : [facility] and given Alteplase and sent to [facility] for concern of  M1 occlusion for emergent thrombectomy.  S/P thrombectomy with improvement and minimal residual deficits  11/21: MRI brain showed right basal ganglia stroke in the internal capsule and corona radiata    --Continue atorvastatin and ASA   --Blood pressure control   --PT/OT/ST following, recommending home health for discharge   --Echocardiogram w/ bubble study done. No acute findings  --Neurology recommending cardiology consult for cardiac event monitoring. Will request formal consult on Monday as this is unable to be arranged on the weekend       Anemia  Assessment & Plan  Hgb trending  down , unsure of her baseline   Hemodynamically stable   -- Lab work-up ordered for morning labs   -- Follow CBC     Hypokalemia  Assessment & Plan  Still low at 3.3   - Repleted PO   - Monitor on BMP tomorrow       Essential hypertension  Assessment & Plan  SBPs 111-127  -- Hold atenolol and HCTZ  -- Monitor VS per policy        VTE prophylaxis: lovenox      I have performed a physical exam and reviewed and updated ROS and Plan today (11/22/2020). In review of yesterday's note (11/21/2020), there are no changes except as documented above.

## 2020-11-22 NOTE — DISCHARGE PLANNING
Received Choice form at [phone]  Agency/Facility Name: [facility]  Referral sent per Choice form @ [phone]

## 2020-11-22 NOTE — DISCHARGE PLANNING
Anticipated Discharge Disposition: home with home health    Action: Spoke with patient at bedside regarding home health choice. Patient stated that son will be coming to visit this afternoon and requests that I return to discuss choice with him present.     Instructed patient to notify nurse to contact me once son arrives.     Barriers to Discharge: home health referral & acceptance. Patient is medically clear    Plan: follow up in afternoon to obtain choice and send referral.

## 2020-11-22 NOTE — PROGRESS NOTES
Monitor summary: SR 71-96, NM 0.16, QRS 0.08, QT 0.38, with rare to frequent PVCs, rare trigeminy and rare couplets per strip from monitor room.

## 2020-11-22 NOTE — DISCHARGE PLANNING
Anticipated Discharge Disposition: home with home health    Action: spoke with son Ervin lFanagan to obtain choice with permission from patient. Obtained telephone consent for home health [facility] referrals: [facility]. [facility]. 2. [facility]. 3. [facility]. Faxed completed choice form to GARTH Mendoza at [phone].     Per rn supervisor, provider has given approval for patient to discharge with home health pending. Patient is anxious to leave. Son coming to pick patient up between 4-5 pm.     Barriers to Discharge: none.     Plan: discharge home with home health.

## 2020-11-22 NOTE — PROGRESS NOTES
With patient’s permission (if able), completed daily phone call to designated support person, name Ervin pt's son  Discussed patient condition and plan of care. All questions answered.  Follow up requested: discharge

## 2020-11-22 NOTE — THERAPY
Physical Therapy   Daily Treatment     Patient Name: Priscila Flanagan  Age:  85 y.o., Sex:  female  Medical Record #: [id number]  Today's Date: 11/21/2020     Precautions: Fall Risk, Swallow Precautions ( See Comments)    Assessment    Son present during session, pt declined ambulation and stairs today. Son expressed verbal confidence of being able to assist pt up two stairs into her area of living, pt agreed. Both very inquisitive about pt condition. Pt and son educated about CVA dx and importance of home health at d/c. Will follow.     Plan    Continue current treatment plan    DC Equipment Recommendations: Unable to determine at this time  Discharge Recommendations: Recommend home health for continued physical therapy services         Objective       11/21/20 1628   Gait Analysis   Gait Level Of Assist Refused   Level of Assist with Stairs Refused   Comments son present during education session. Pt declined attempting to ambulate or complete stairs today. Per son he feels confident that he can help her out with the 2 stairs to get into her area of living in the house, as well as getting around the house since he has this next week off.    Patient / Family Goals    Patient / Family Goal #1 to go home   Goal #1 Outcome Progressing as expected   Short Term Goals    Short Term Goal # 1 Pt will be able to negotiate 2 steps with spv and no AD within 6 visits to ensure entry/exit into house.   Goal Outcome # 1 goal not met   Short Term Goal # 2 Pt will be able to ambulate 150 ft with no AD and spv within 6 visits to ensure mobility at home.   Goal Outcome # 2 Goal not met   Education Group   Role of Physical Therapist Patient Response Patient;Acceptance;Explanation;Family;Verbal Demonstration;Reinforcement Needed   CVA Patient Response Patient;Family;Acceptance;Explanation;Verbal Demonstration   Additional Comments stroke education

## 2020-11-22 NOTE — ASSESSMENT & PLAN NOTE
Hgb trending down , unsure of her baseline   Hemodynamically stable   -- Lab work-up ordered for morning labs   -- Follow CBC

## 2020-12-19 ENCOUNTER — APPOINTMENT (OUTPATIENT)
Dept: RADIOLOGY | Facility: MEDICAL CENTER | Age: 85
End: 2020-12-19
Attending: EMERGENCY MEDICINE
Payer: MEDICARE

## 2020-12-19 ENCOUNTER — HOSPITAL ENCOUNTER (OUTPATIENT)
Facility: MEDICAL CENTER | Age: 85
End: 2020-12-20
Attending: EMERGENCY MEDICINE | Admitting: STUDENT IN AN ORGANIZED HEALTH CARE EDUCATION/TRAINING PROGRAM
Payer: MEDICARE

## 2020-12-19 ENCOUNTER — APPOINTMENT (OUTPATIENT)
Dept: RADIOLOGY | Facility: MEDICAL CENTER | Age: 85
End: 2020-12-19
Payer: MEDICARE

## 2020-12-19 DIAGNOSIS — I48.91 ATRIAL FIBRILLATION WITH RVR (HCC): ICD-10-CM

## 2020-12-19 PROBLEM — I48.0 PAROXYSMAL ATRIAL FIBRILLATION (HCC): Status: ACTIVE | Noted: 2020-12-19

## 2020-12-19 PROBLEM — E78.5 DYSLIPIDEMIA: Status: ACTIVE | Noted: 2020-12-19

## 2020-12-19 LAB
ALBUMIN SERPL BCP-MCNC: 3.3 G/DL (ref 3.2–4.9)
ALBUMIN/GLOB SERPL: 0.9 G/DL
ALP SERPL-CCNC: 105 U/L (ref 30–99)
ALT SERPL-CCNC: 42 U/L (ref 2–50)
ANION GAP SERPL CALC-SCNC: 14 MMOL/L (ref 7–16)
AST SERPL-CCNC: 38 U/L (ref 12–45)
BASOPHILS # BLD AUTO: 0.6 % (ref 0–1.8)
BASOPHILS # BLD: 0.05 K/UL (ref 0–0.12)
BILIRUB SERPL-MCNC: 0.5 MG/DL (ref 0.1–1.5)
BUN SERPL-MCNC: 9 MG/DL (ref 8–22)
CALCIUM SERPL-MCNC: 9 MG/DL (ref 8.5–10.5)
CHLORIDE SERPL-SCNC: 102 MMOL/L (ref 96–112)
CO2 SERPL-SCNC: 21 MMOL/L (ref 20–33)
COVID ORDER STATUS COVID19: NORMAL
CREAT SERPL-MCNC: 0.61 MG/DL (ref 0.5–1.4)
EKG IMPRESSION: NORMAL
EOSINOPHIL # BLD AUTO: 0.26 K/UL (ref 0–0.51)
EOSINOPHIL NFR BLD: 3.3 % (ref 0–6.9)
ERYTHROCYTE [DISTWIDTH] IN BLOOD BY AUTOMATED COUNT: 50.1 FL (ref 35.9–50)
GLOBULIN SER CALC-MCNC: 3.5 G/DL (ref 1.9–3.5)
GLUCOSE SERPL-MCNC: 100 MG/DL (ref 65–99)
HCT VFR BLD AUTO: 41 % (ref 37–47)
HGB BLD-MCNC: 13.8 G/DL (ref 12–16)
IMM GRANULOCYTES # BLD AUTO: 0.04 K/UL (ref 0–0.11)
IMM GRANULOCYTES NFR BLD AUTO: 0.5 % (ref 0–0.9)
LYMPHOCYTES # BLD AUTO: 2.3 K/UL (ref 1–4.8)
LYMPHOCYTES NFR BLD: 29.5 % (ref 22–41)
MCH RBC QN AUTO: 31.9 PG (ref 27–33)
MCHC RBC AUTO-ENTMCNC: 33.7 G/DL (ref 33.6–35)
MCV RBC AUTO: 94.9 FL (ref 81.4–97.8)
MONOCYTES # BLD AUTO: 0.7 K/UL (ref 0–0.85)
MONOCYTES NFR BLD AUTO: 9 % (ref 0–13.4)
NEUTROPHILS # BLD AUTO: 4.44 K/UL (ref 2–7.15)
NEUTROPHILS NFR BLD: 57.1 % (ref 44–72)
NRBC # BLD AUTO: 0.04 K/UL
NRBC BLD-RTO: 0.5 /100 WBC
PLATELET # BLD AUTO: 229 K/UL (ref 164–446)
PMV BLD AUTO: 11.9 FL (ref 9–12.9)
POTASSIUM SERPL-SCNC: 3.4 MMOL/L (ref 3.6–5.5)
PROT SERPL-MCNC: 6.8 G/DL (ref 6–8.2)
RBC # BLD AUTO: 4.32 M/UL (ref 4.2–5.4)
SARS-COV-2 RNA RESP QL NAA+PROBE: DETECTED
SODIUM SERPL-SCNC: 137 MMOL/L (ref 135–145)
SPECIMEN SOURCE: ABNORMAL
TROPONIN T SERPL-MCNC: 16 NG/L (ref 6–19)
WBC # BLD AUTO: 7.8 K/UL (ref 4.8–10.8)

## 2020-12-19 PROCEDURE — 85025 COMPLETE CBC W/AUTO DIFF WBC: CPT

## 2020-12-19 PROCEDURE — G0378 HOSPITAL OBSERVATION PER HR: HCPCS

## 2020-12-19 PROCEDURE — C9803 HOPD COVID-19 SPEC COLLECT: HCPCS | Performed by: STUDENT IN AN ORGANIZED HEALTH CARE EDUCATION/TRAINING PROGRAM

## 2020-12-19 PROCEDURE — 700102 HCHG RX REV CODE 250 W/ 637 OVERRIDE(OP): Performed by: EMERGENCY MEDICINE

## 2020-12-19 PROCEDURE — 99285 EMERGENCY DEPT VISIT HI MDM: CPT

## 2020-12-19 PROCEDURE — 71045 X-RAY EXAM CHEST 1 VIEW: CPT

## 2020-12-19 PROCEDURE — 96374 THER/PROPH/DIAG INJ IV PUSH: CPT

## 2020-12-19 PROCEDURE — 93005 ELECTROCARDIOGRAM TRACING: CPT | Performed by: EMERGENCY MEDICINE

## 2020-12-19 PROCEDURE — 700111 HCHG RX REV CODE 636 W/ 250 OVERRIDE (IP): Performed by: EMERGENCY MEDICINE

## 2020-12-19 PROCEDURE — A9270 NON-COVERED ITEM OR SERVICE: HCPCS | Performed by: STUDENT IN AN ORGANIZED HEALTH CARE EDUCATION/TRAINING PROGRAM

## 2020-12-19 PROCEDURE — 700102 HCHG RX REV CODE 250 W/ 637 OVERRIDE(OP): Performed by: STUDENT IN AN ORGANIZED HEALTH CARE EDUCATION/TRAINING PROGRAM

## 2020-12-19 PROCEDURE — U0003 INFECTIOUS AGENT DETECTION BY NUCLEIC ACID (DNA OR RNA); SEVERE ACUTE RESPIRATORY SYNDROME CORONAVIRUS 2 (SARS-COV-2) (CORONAVIRUS DISEASE [COVID-19]), AMPLIFIED PROBE TECHNIQUE, MAKING USE OF HIGH THROUGHPUT TECHNOLOGIES AS DESCRIBED BY CMS-2020-01-R: HCPCS

## 2020-12-19 PROCEDURE — 80053 COMPREHEN METABOLIC PANEL: CPT

## 2020-12-19 PROCEDURE — 99220 PR INITIAL OBSERVATION CARE,LEVL III: CPT | Performed by: STUDENT IN AN ORGANIZED HEALTH CARE EDUCATION/TRAINING PROGRAM

## 2020-12-19 PROCEDURE — A9270 NON-COVERED ITEM OR SERVICE: HCPCS | Performed by: EMERGENCY MEDICINE

## 2020-12-19 PROCEDURE — 84484 ASSAY OF TROPONIN QUANT: CPT

## 2020-12-19 RX ORDER — POTASSIUM CHLORIDE 750 MG/1
10 TABLET, EXTENDED RELEASE ORAL 2 TIMES DAILY
Status: ON HOLD | COMMUNITY
End: 2020-12-20 | Stop reason: SDUPTHER

## 2020-12-19 RX ORDER — NITROGLYCERIN 0.4 MG/1
0.4 TABLET SUBLINGUAL PRN
COMMUNITY
End: 2020-12-19

## 2020-12-19 RX ORDER — BISACODYL 10 MG
10 SUPPOSITORY, RECTAL RECTAL
Status: DISCONTINUED | OUTPATIENT
Start: 2020-12-19 | End: 2020-12-20 | Stop reason: HOSPADM

## 2020-12-19 RX ORDER — ATENOLOL 50 MG/1
50 TABLET ORAL DAILY
COMMUNITY
End: 2020-12-19

## 2020-12-19 RX ORDER — DILTIAZEM HYDROCHLORIDE 5 MG/ML
20 INJECTION INTRAVENOUS ONCE
Status: COMPLETED | OUTPATIENT
Start: 2020-12-19 | End: 2020-12-19

## 2020-12-19 RX ORDER — AMOXICILLIN AND CLAVULANATE POTASSIUM 400; 57 MG/5ML; MG/5ML
11 POWDER, FOR SUSPENSION ORAL 2 TIMES DAILY
Status: ON HOLD | COMMUNITY
End: 2020-12-20

## 2020-12-19 RX ORDER — POLYETHYLENE GLYCOL 3350 17 G/17G
1 POWDER, FOR SOLUTION ORAL
Status: DISCONTINUED | OUTPATIENT
Start: 2020-12-19 | End: 2020-12-20 | Stop reason: HOSPADM

## 2020-12-19 RX ORDER — AMOXICILLIN 250 MG
2 CAPSULE ORAL 2 TIMES DAILY
Status: DISCONTINUED | OUTPATIENT
Start: 2020-12-20 | End: 2020-12-20 | Stop reason: HOSPADM

## 2020-12-19 RX ADMIN — DILTIAZEM HYDROCHLORIDE 20 MG: 5 INJECTION INTRAVENOUS at 14:39

## 2020-12-19 RX ADMIN — APIXABAN 5 MG: 5 TABLET, FILM COATED ORAL at 18:22

## 2020-12-19 RX ADMIN — METOPROLOL TARTRATE 25 MG: 25 TABLET, FILM COATED ORAL at 15:27

## 2020-12-19 ASSESSMENT — LIFESTYLE VARIABLES
HAVE YOU EVER FELT YOU SHOULD CUT DOWN ON YOUR DRINKING: NO
CONSUMPTION TOTAL: NEGATIVE
EVER FELT BAD OR GUILTY ABOUT YOUR DRINKING: NO
ON A TYPICAL DAY WHEN YOU DRINK ALCOHOL HOW MANY DRINKS DO YOU HAVE: 0
TOTAL SCORE: 0
HAVE PEOPLE ANNOYED YOU BY CRITICIZING YOUR DRINKING: NO
TOTAL SCORE: 0
AVERAGE NUMBER OF DAYS PER WEEK YOU HAVE A DRINK CONTAINING ALCOHOL: 0
EVER HAD A DRINK FIRST THING IN THE MORNING TO STEADY YOUR NERVES TO GET RID OF A HANGOVER: NO
ALCOHOL_USE: NO
TOTAL SCORE: 0
HOW MANY TIMES IN THE PAST YEAR HAVE YOU HAD 5 OR MORE DRINKS IN A DAY: 0

## 2020-12-19 ASSESSMENT — CHA2DS2 SCORE
CHA2DS2 VASC SCORE: 5
VASCULAR DISEASE: NO
HYPERTENSION: NO
SEX: FEMALE
AGE 75 OR GREATER: YES
CHF OR LEFT VENTRICULAR DYSFUNCTION: NO
DIABETES: NO
PRIOR STROKE OR TIA OR THROMBOEMBOLISM: YES
AGE 65 TO 74: NO

## 2020-12-19 ASSESSMENT — COGNITIVE AND FUNCTIONAL STATUS - GENERAL
DAILY ACTIVITIY SCORE: 23
WALKING IN HOSPITAL ROOM: A LITTLE
STANDING UP FROM CHAIR USING ARMS: A LITTLE
MOVING FROM LYING ON BACK TO SITTING ON SIDE OF FLAT BED: A LITTLE
SUGGESTED CMS G CODE MODIFIER DAILY ACTIVITY: CI
DRESSING REGULAR LOWER BODY CLOTHING: A LITTLE
CLIMB 3 TO 5 STEPS WITH RAILING: A LOT
MOBILITY SCORE: 19
SUGGESTED CMS G CODE MODIFIER MOBILITY: CK

## 2020-12-19 ASSESSMENT — ENCOUNTER SYMPTOMS
CHEST TIGHTNESS: 0
STRIDOR: 0
CHOKING: 0
APNEA: 0
SHORTNESS OF BREATH: 0
WHEEZING: 0
COUGH: 0

## 2020-12-19 ASSESSMENT — PATIENT HEALTH QUESTIONNAIRE - PHQ9
1. LITTLE INTEREST OR PLEASURE IN DOING THINGS: NOT AT ALL
2. FEELING DOWN, DEPRESSED, IRRITABLE, OR HOPELESS: NOT AT ALL
SUM OF ALL RESPONSES TO PHQ9 QUESTIONS 1 AND 2: 0

## 2020-12-19 ASSESSMENT — FIBROSIS 4 INDEX
FIB4 SCORE: 2.2
FIB4 SCORE: 2.33

## 2020-12-19 ASSESSMENT — PAIN DESCRIPTION - PAIN TYPE: TYPE: ACUTE PAIN

## 2020-12-19 NOTE — CONSULTS
Cardiology Initial Consultation    Date of Service  12/19/2020    Referring Physician  Jose Hernandez M.D.    Reason for Consultation  Atrial fibrillation RVR, asymptomatic, recent diagnosis (12/14/2020)      Recent admission [facility] 12/14/2020 for swelling of left foot found to have pneumonia and found to be in atrial fibrillation RVR, Eliquis was initiated, she was hospitalized for 3 days.    History of Presenting Illness  Priscila Flanagan is a 86 y.o. female with a past medical history of hypertension and recent CVA 11/19/2020 [facility] received alteplase, sent to [facility] 11/19/20 for emergent thrombectomy, underwent successful thrombectomy with improvement and with minimal residual deficit she was discharged on 11/22/20, this amorning she was sent to ER by home health nurse while she was checking on her found rapid heart rate, EKG in ER revealed atrial fibrillation RVR ( rate 138 ), patient denied symptoms, received IV x1 Diltiazem, heart rate low 100's post DIltiazem.    Review of Systems  Review of Systems   Respiratory: Negative for apnea, cough, choking, chest tightness, shortness of breath, wheezing and stridor.        Past Medical History   has a past medical history of Hypertension and Post-menopause (10/22/2014). She also has no past medical history of Asthma or Type II or unspecified type diabetes mellitus without mention of complication, not stated as uncontrolled.    Surgical History   has a past surgical history that includes abdominal hysterectomy total and open reduction.    Family History  family history includes Cancer in her mother and sister; Genetic Disorder in her brother; No Known Problems in her father, maternal grandfather, maternal grandmother, paternal grandfather, and paternal grandmother.    Social History   reports that she quit smoking about 59 years ago. Her smoking use included cigarettes. She has a 2.50 pack-year smoking history. She has never used smokeless tobacco. She reports current  alcohol use. She reports that she does not use drugs.    Medications  Prior to Admission Medications   Prescriptions Last Dose Informant Patient Reported? Taking?   aspirin (ASA) 325 MG Tab   No No   Sig: Take 1 Tab by mouth every day.   atorvastatin (LIPITOR) 40 MG Tab   No No   Sig: Take 1 Tab by mouth every evening.   hydroCHLOROthiazide (HYDRODIURIL) 25 MG Tab   No No   Sig: Take 1 Tab by mouth every day.      Facility-Administered Medications: None       Allergies  No Known Allergies    Vital signs in last 24 hours  Temp:  [36.9 °C (98.4 °F)] 36.9 °C (98.4 °F)  Pulse:  [] 89  Resp:  [18-21] 18  BP: (110-136)/(55-82) 110/55  SpO2:  [93 %-97 %] 93 %    Physical Exam  Physical Exam  Cardiovascular:      Rate and Rhythm: Tachycardia present. Rhythm irregular.      Pulses: Normal pulses.   Pulmonary:      Effort: Pulmonary effort is normal.   Abdominal:      General: Abdomen is flat.   Skin:     General: Skin is warm.   Neurological:      General: No focal deficit present.      Mental Status: She is alert.   Psychiatric:         Mood and Affect: Mood normal.         Lab Review  Lab Results   Component Value Date/Time    WBC 7.8 12/19/2020 02:06 PM    RBC 4.32 12/19/2020 02:06 PM    HEMOGLOBIN 13.8 12/19/2020 02:06 PM    HEMATOCRIT 41.0 12/19/2020 02:06 PM    MCV 94.9 12/19/2020 02:06 PM    MCH 31.9 12/19/2020 02:06 PM    MCHC 33.7 12/19/2020 02:06 PM    MPV 11.9 12/19/2020 02:06 PM      Lab Results   Component Value Date/Time    SODIUM 137 12/19/2020 02:39 PM    POTASSIUM 3.4 (L) 12/19/2020 02:39 PM    CHLORIDE 102 12/19/2020 02:39 PM    CO2 21 12/19/2020 02:39 PM    GLUCOSE 100 (H) 12/19/2020 02:39 PM    BUN 9 12/19/2020 02:39 PM    CREATININE 0.61 12/19/2020 02:39 PM      Lab Results   Component Value Date/Time    ASTSGOT 38 12/19/2020 02:39 PM    ALTSGPT 42 12/19/2020 02:39 PM     Lab Results   Component Value Date/Time    CHOLSTRLTOT 154 11/20/2020 02:45 AM    LDL 82 11/20/2020 02:45 AM    HDL 47  11/20/2020 02:45 AM    TRIGLYCERIDE 127 11/20/2020 02:45 AM    TROPONINT 16 12/19/2020 02:39 PM       No results for input(s): NTPROBNP in the last 72 hours.    Cardiac Imaging and Procedures Review  EKG: Atrial fibrillation, rate 138    Echocardiogram: 11/20/2020 no prior study is available for comparison.   Normal left ventricular systolic function.  Left ventricular ejection fraction is visually estimated to be 65%.  Mild mitral regurgitation.  Mild aortic insufficiency.  Mild tricuspid regurgitation.  Estimated right ventricular systolic pressure is 30 mmHg.  A definite cardiac source for a systemic thromboembolic event is not   identified, failure to do so does not exclude its presence. If   clinically indicated, a transesophageal study would be useful.           Cardiac Catheterization: Not applicable    Imaging  Chest X-Ray: 12/19/2020 mild diffuse increased interstitial opacity which may represent interstitial edema or pneumonia. No lobar consolidation is present.    Stress Test: Not applicable      MRI brain 11/19/20  demonstrating right basal ganglia stroke in the internal capsule and corona radiata.  Most likely etiology is large vessel disease ie atheroembolic vs. Cardioembolic.  Significant clinical improvement.        Assessment/Plan      Paroxysmal atrial fibrillation RVR, recent diagnosis [facility] 12/14/20.  Recent pneumonia [facility] ( 12/14/20).  COVID negative ( 11/19/20 ), pending Covid this admission.  Recent right MCA [id number]/2020.  S/P TPA at [facility], 11/19/2020.  Mechanical thrombectomy, [facility], 11/19/2020..  LVEF 65%.        Plan  Start Metoprolol 25 mg twice daily and uptitrate as blood pressure permits for resting heart rate less than 110.  Continue with Eliquis  Continue with Atorvastatin.  Stop ASA  Cardiology will follow up in am.    Please contact me with any questions.    GUI Leahy.   Cardiologist, [facility]  [phone]

## 2020-12-19 NOTE — ED PROVIDER NOTES
ED Provider Note    CHIEF COMPLAINT  Fast heart rate    HPI  Priscila Flanagan is a 86 y.o. female who presents with a fast heart rate.  Contrary to the nursing notes, the patient has had no symptoms at all.  Her home health nurse was checking on her and found her to have A. fib.  The patient states that she has had this frequently, although I see no mention of it in the hospital when she was admitted here last month for post thrombolytic stroke evaluation which she had a thrombectomy.  There was discussion about putting her on a cardiac monitor but that was not performed.  She was asked to follow-up with her PCP to arrange for follow-up with cardiology, but has not yet done so.  She denies palpitations.  She has no headache, no weakness or numbness.  No chest pain or shortness of breath.  She states that since her stroke she feels like she is getting her strength back, and is in fact doing better.  There is no other complaint.    PAST MEDICAL HISTORY  Past Medical History:   Diagnosis Date   • Hypertension    • Post-menopause 10/22/2014       FAMILY HISTORY  Family History   Problem Relation Age of Onset   • Cancer Mother         uterine cancer   • Cancer Sister         breast cancer   • No Known Problems Father    • No Known Problems Maternal Grandmother    • No Known Problems Maternal Grandfather    • No Known Problems Paternal Grandmother    • No Known Problems Paternal Grandfather    • Genetic Disorder Brother         COPD       SOCIAL HISTORY  Social History     Tobacco Use   • Smoking status: Former Smoker     Packs/day: 0.50     Years: 5.00     Pack years: 2.50     Types: Cigarettes     Quit date: 1961     Years since quittin.5   • Smokeless tobacco: Never Used   • Tobacco comment: stated smoking at age 22   Substance Use Topics   • Alcohol use: Yes     Comment: occasional   • Drug use: No         SURGICAL HISTORY  Past Surgical History:   Procedure Laterality Date   • ABDOMINAL HYSTERECTOMY TOTAL    "  • OPEN REDUCTION      ankle x2       CURRENT MEDICATIONS    I have reviewed the nurses notes and/or the list brought with the patient.    ALLERGIES  No Known Allergies    REVIEW OF SYSTEMS  See HPI for further details. Review of systems as above, otherwise all other systems are negative.     PHYSICAL EXAM  VITAL SIGNS: /65   Pulse (!) 148   Temp 36.9 °C (98.4 °F) (Temporal)   Resp 20   Ht 1.626 m (5' 4\")   Wt 64.4 kg (142 lb)   SpO2 95%   BMI 24.37 kg/m²     Constitutional: Somewhat frail but otherwise well appearing patient in no acute distress.  Not toxic, nor ill in appearance.  HENT: Mucus membranes moist.  Oropharynx is clear.  Eyes: Pupils equally round.  No scleral icterus.   Neck: Full nontender range of motion.  Lymphatic: No cervical lymphadenopathy noted.   Cardiovascular: Fast and irregular.  No murmurs, rubs, nor gallop appreciated.   Thorax & Lungs: Chest is nontender.  Lungs are clear to auscultation with good air movement bilaterally.  No wheeze, rhonchi, nor rales.   Abdomen: Soft, with no tenderness, rebound nor guarding.  No mass, pulsatile mass, nor hepatosplenomegaly appreciated.  Skin: No purpura nor petechia noted.  Extremities/Musculoskeletal: No sign of trauma.  Calves are nontender with no cords nor edema.  No Brit's sign.  Pulses are intact all around.   Neurologic: Alert & oriented.  Strength and sensation is intact all around.   Psychiatric: Normal affect appropriate for the clinical situation.    EKG  I interpreted this EKG myself.  This is a 12-lead study.  The rhythm is atrial fibrillation with a rate of 148.  There is a PVC.  There are no ST segment nor T wave abnormalities.  Interpretation: No ST segment elevation myocardial infarction.  A. fib with biventricular response.    LABS  Labs Reviewed   CBC WITH DIFFERENTIAL - Abnormal; Notable for the following components:       Result Value    RDW 50.1 (*)     All other components within normal limits   COMP METABOLIC " PANEL - Abnormal; Notable for the following components:    Potassium 3.4 (*)     Glucose 100 (*)     Alkaline Phosphatase 105 (*)     All other components within normal limits    Narrative:     SPECIMEN IS A RECOLLECT   TROPONIN    Narrative:     SPECIMEN IS A RECOLLECT   ESTIMATED GFR    Narrative:     SPECIMEN IS A RECOLLECT         RADIOLOGY/PROCEDURES  I have reviewed the patient's film interpretations myself, and they are read out by the radiologist as:   DX-CHEST-PORTABLE (1 VIEW)   Final Result      Mild diffuse increased interstitial opacity which may represent interstitial edema or pneumonia. No lobar consolidation is present.        .    MEDICAL RECORD  I have reviewed patient's medical record and pertinent results are listed above.    COURSE & MEDICAL DECISION MAKING  I have reviewed any medical record information, laboratory studies and radiographic results as noted above.  The patient presents with apparently history of atrial fibrillation although we do not have that documented in the computer.  Here with atrial fibrillation with rapid ventricular rate.  She is not symptomatic at all.  I did give her a dose of diltiazem for rate control, this is brought her pulse rate down into the 90s.  She continues to be asymptomatic.  I discussed the patient's case with Dr. Douglas Norton from cardiology.  He has suggested observation overnight, and for now starting her on metoprolol tartrate 25 mg twice daily.  In terms of anticoagulation, he suspects that she would be a good candidate for this but has asked for neurology for their input with her recent stroke. I spoke with Dr. Wei, who has no hesitation to start her up on anticoagulation. I placed a call the [facility] hospitalist, Dr. Dewey, she will be taking over at this time, and recommendations were passed on to her from cardiology and neurology.  Discussed all this with the patient, she is agreeable to the plan.        FINAL IMPRESSION  1. Atrial  fibrillation with RVR (HCC)    2.  Recent stroke     This dictation was created using voice recognition software.    Electronically signed by: Jose Hernandez M.D., 12/19/2020 2:24 PM

## 2020-12-19 NOTE — ED NOTES
Unable to address med rec at this time. Pt does not know the names of her medications and states that her daughter Maru () helps her with them. Attempted to contact Pt's daughter via phone number provided by Pt but did not receive answer; left voicemail with request to call back.

## 2020-12-20 VITALS
WEIGHT: 128.53 LBS | SYSTOLIC BLOOD PRESSURE: 115 MMHG | BODY MASS INDEX: 21.94 KG/M2 | DIASTOLIC BLOOD PRESSURE: 70 MMHG | RESPIRATION RATE: 18 BRPM | HEART RATE: 76 BPM | HEIGHT: 64 IN | TEMPERATURE: 97.9 F | OXYGEN SATURATION: 97 %

## 2020-12-20 LAB
ANION GAP SERPL CALC-SCNC: 11 MMOL/L (ref 7–16)
BASOPHILS # BLD AUTO: 1.4 % (ref 0–1.8)
BASOPHILS # BLD: 0.08 K/UL (ref 0–0.12)
BUN SERPL-MCNC: 7 MG/DL (ref 8–22)
CALCIUM SERPL-MCNC: 9.5 MG/DL (ref 8.5–10.5)
CHLORIDE SERPL-SCNC: 99 MMOL/L (ref 96–112)
CO2 SERPL-SCNC: 27 MMOL/L (ref 20–33)
CREAT SERPL-MCNC: 0.53 MG/DL (ref 0.5–1.4)
EOSINOPHIL # BLD AUTO: 0.29 K/UL (ref 0–0.51)
EOSINOPHIL NFR BLD: 5.1 % (ref 0–6.9)
ERYTHROCYTE [DISTWIDTH] IN BLOOD BY AUTOMATED COUNT: 52.9 FL (ref 35.9–50)
GLUCOSE SERPL-MCNC: 97 MG/DL (ref 65–99)
HCT VFR BLD AUTO: 46.7 % (ref 37–47)
HGB BLD-MCNC: 14.8 G/DL (ref 12–16)
IMM GRANULOCYTES # BLD AUTO: 0.02 K/UL (ref 0–0.11)
IMM GRANULOCYTES NFR BLD AUTO: 0.4 % (ref 0–0.9)
LYMPHOCYTES # BLD AUTO: 1.78 K/UL (ref 1–4.8)
LYMPHOCYTES NFR BLD: 31.6 % (ref 22–41)
MCH RBC QN AUTO: 31.4 PG (ref 27–33)
MCHC RBC AUTO-ENTMCNC: 31.7 G/DL (ref 33.6–35)
MCV RBC AUTO: 99.2 FL (ref 81.4–97.8)
MONOCYTES # BLD AUTO: 0.41 K/UL (ref 0–0.85)
MONOCYTES NFR BLD AUTO: 7.3 % (ref 0–13.4)
NEUTROPHILS # BLD AUTO: 3.06 K/UL (ref 2–7.15)
NEUTROPHILS NFR BLD: 54.2 % (ref 44–72)
NRBC # BLD AUTO: 0 K/UL
NRBC BLD-RTO: 0 /100 WBC
PLATELET # BLD AUTO: 224 K/UL (ref 164–446)
PMV BLD AUTO: 11 FL (ref 9–12.9)
POTASSIUM SERPL-SCNC: 3.1 MMOL/L (ref 3.6–5.5)
RBC # BLD AUTO: 4.71 M/UL (ref 4.2–5.4)
SODIUM SERPL-SCNC: 137 MMOL/L (ref 135–145)
WBC # BLD AUTO: 5.6 K/UL (ref 4.8–10.8)

## 2020-12-20 PROCEDURE — A9270 NON-COVERED ITEM OR SERVICE: HCPCS | Performed by: STUDENT IN AN ORGANIZED HEALTH CARE EDUCATION/TRAINING PROGRAM

## 2020-12-20 PROCEDURE — 700102 HCHG RX REV CODE 250 W/ 637 OVERRIDE(OP): Performed by: STUDENT IN AN ORGANIZED HEALTH CARE EDUCATION/TRAINING PROGRAM

## 2020-12-20 PROCEDURE — 99217 PR OBSERVATION CARE DISCHARGE: CPT | Performed by: STUDENT IN AN ORGANIZED HEALTH CARE EDUCATION/TRAINING PROGRAM

## 2020-12-20 PROCEDURE — 36415 COLL VENOUS BLD VENIPUNCTURE: CPT

## 2020-12-20 PROCEDURE — G0378 HOSPITAL OBSERVATION PER HR: HCPCS

## 2020-12-20 PROCEDURE — 80048 BASIC METABOLIC PNL TOTAL CA: CPT

## 2020-12-20 PROCEDURE — 85025 COMPLETE CBC W/AUTO DIFF WBC: CPT

## 2020-12-20 RX ORDER — ATORVASTATIN CALCIUM 40 MG/1
40 TABLET, FILM COATED ORAL EVERY EVENING
Qty: 30 TAB | Refills: 0 | Status: SHIPPED | OUTPATIENT
Start: 2020-12-20

## 2020-12-20 RX ORDER — POTASSIUM CHLORIDE 750 MG/1
20 TABLET, EXTENDED RELEASE ORAL 2 TIMES DAILY
Qty: 90 TAB | Refills: 0 | Status: SHIPPED | OUTPATIENT
Start: 2020-12-20

## 2020-12-20 RX ORDER — POTASSIUM CHLORIDE 20 MEQ/1
40 TABLET, EXTENDED RELEASE ORAL
Status: COMPLETED | OUTPATIENT
Start: 2020-12-20 | End: 2020-12-20

## 2020-12-20 RX ADMIN — POTASSIUM CHLORIDE 40 MEQ: 1500 TABLET, EXTENDED RELEASE ORAL at 10:56

## 2020-12-20 RX ADMIN — POTASSIUM CHLORIDE 40 MEQ: 1500 TABLET, EXTENDED RELEASE ORAL at 07:58

## 2020-12-20 RX ADMIN — DOCUSATE SODIUM 50 MG AND SENNOSIDES 8.6 MG 2 TABLET: 8.6; 5 TABLET, FILM COATED ORAL at 06:30

## 2020-12-20 RX ADMIN — METOPROLOL TARTRATE 25 MG: 25 TABLET, FILM COATED ORAL at 06:30

## 2020-12-20 RX ADMIN — APIXABAN 5 MG: 5 TABLET, FILM COATED ORAL at 06:30

## 2020-12-20 NOTE — PROGRESS NOTES
Pt brought to T7 by T8 RN and transport, pt placed on T7 monitor, monitor room informed, VS taken,  placed on pt, assessment complete.  Pt has no complains or signs of distress at the moment.  Non skid socks on pt, bed locked and in lowest position, call light and belongings within reach. Will continue to monitor for any physiological or cognitive changes. Hourly rounding in place.

## 2020-12-20 NOTE — PROGRESS NOTES
Assumed care at 2100, bedside report received from Josh SWEENEY. Pt. Is Afib on the monitor. Initial assessment completed, orders reviewed, call light within reach, bed alarm  in use, and hourly rounding in place. POC addressed with patient, no additional questions at this time.

## 2020-12-20 NOTE — ASSESSMENT & PLAN NOTE
CVA on 11/19/2020 s/p alteplase, s/p successful thrombectomy with improvement, minimal residual deficit  Afib with RVR discussed by EDP, Dr. Hernandez, with Neurology, Dr. Wei, who is okay with full anticoagulation for the patient

## 2020-12-20 NOTE — CARE PLAN
Problem: Safety  Goal: Will remain free from injury  Outcome: PROGRESSING AS EXPECTED  Goal: Will remain free from falls  Outcome: PROGRESSING AS EXPECTED     Problem: Knowledge Deficit  Goal: Knowledge of disease process/condition, treatment plan, diagnostic tests, and medications will improve  Outcome: PROGRESSING AS EXPECTED  Goal: Knowledge of the prescribed therapeutic regimen will improve  Outcome: PROGRESSING AS EXPECTED     Problem: Bowel/Gastric:  Goal: Normal bowel function is maintained or improved  Outcome: PROGRESSING AS EXPECTED  Goal: Will not experience complications related to bowel motility  Outcome: PROGRESSING AS EXPECTED      ambulatory

## 2020-12-20 NOTE — DISCHARGE SUMMARY
Discharge Summary    CHIEF COMPLAINT ON ADMISSION  Chief Complaint   Patient presents with   • Lightheadedness     x 2 hours; pt was recently discharged w dx of CVA, no lasting deficits; pt started eliquis on Thursday; pt received 300 mL fluid per EMS; FSBS 111 per EMS       Reason for Admission  EMS     Admission Date  12/19/2020    CODE STATUS  Full Code    HPI & HOSPITAL COURSE  An 86-year-old woman with h/o HTN, Afib (on eliquis), recent CVA (11/19/2020, s/p alteplase, thrombectomy - minimal residual deficit) was referred by home nurse for Afib with RVR.   In ED patent received IV diltiazem, which brought her heart rate to 100s.  Cardiology recommendede anticoagulation, metoprolol, diltiazem and cardiology follow up as outpatient.  COVID 19 positive. CXR showed mild diffuse increased interstitial opacity which may represent interstitial edema or pneumonia.   ECG revealed atrial fibrillation with rage 138. Heart rate today 76. Saturation 97% on room air.    Therefore, she is discharged in fair and stable condition to home with close outpatient follow-up.    Paroxysmal atrial fibrillation (HCC) with RVR- (present on admission)  Assessment & Plan  Recently diagnosed at [facility]  Per cardiology recs: continue metoprolol 25 mg twice daily, titrate up as possible with goal HR < 110  Continue home Eliquis.  Stop home aspirin     Recent h/o CVA (cerebral vascular accident) (HCC)- (present on admission)  Assessment & Plan  CVA on 11/19/2020 s/p alteplase, s/p successful thrombectomy with improvement, minimal residual deficit  Afib with RVR discussed by EDP, Dr. Hernandez, with Neurology, Dr. Wei, who is okay with full anticoagulation for the patient     Dyslipidemia- (present on admission)  Assessment & Plan  Continue home atorvastatin     Hypokalemia- (present on admission)  Assessment & Plan  Replete to keep K > 4  Monitor BMP    Discharge Date  12/20/2020    FOLLOW UP ITEMS POST DISCHARGE  Follow up  with PCP within one week of discharge.  Follow up with cardiology as outpatient.    DISCHARGE DIAGNOSES  Principal Problem:    Paroxysmal atrial fibrillation (HCC) with RVR POA: Yes  Active Problems:    Recent h/o CVA (cerebral vascular accident) (HCC) POA: Yes    Hypokalemia POA: Yes    Dyslipidemia POA: Yes  Resolved Problems:    * No resolved hospital problems. *      FOLLOW UP  No future appointments.  Denver J Miller, M.D.  [id number] [facility] [id number] [phone]    In 1 week        MEDICATIONS ON DISCHARGE     Medication List      CHANGE how you take these medications      Instructions   metoprolol 25 MG Tabs  What changed:   · how much to take  · when to take this  · additional instructions  Commonly known as: LOPRESSOR   Take 1 Tab by mouth 2 Times a Day.  Dose: 25 mg     potassium chloride SA 10 MEQ Tbcr  What changed: how much to take  Commonly known as: K-DUR   Take 2 Tabs by mouth 2 times a day.  Dose: 20 mEq        CONTINUE taking these medications      Instructions   apixaban 5mg Tabs  Commonly known as: Eliquis   Take 1 Tab by mouth 2 Times a Day.  Dose: 5 mg     atorvastatin 40 MG Tabs  Commonly known as: LIPITOR   Take 1 Tab by mouth every evening.  Dose: 40 mg        STOP taking these medications    amoxicillin-clavulanate 400-57 MG/5ML Susr suspension  Commonly known as: AUGMENTIN            Allergies  No Known Allergies    DIET  Orders Placed This Encounter   Procedures   • Diet Order Diet: Cardiac     Standing Status:   Standing     Number of Occurrences:   1     Order Specific Question:   Diet:     Answer:   Cardiac [6]       ACTIVITY  As tolerated.  Weight bearing as tolerated    CONSULTATIONS  Cardiology    PROCEDURES  None    LABORATORY  Lab Results   Component Value Date    SODIUM 137 12/20/2020    POTASSIUM 3.1 (L) 12/20/2020    CHLORIDE 99 12/20/2020    CO2 27 12/20/2020    GLUCOSE 97 12/20/2020    BUN 7 (L) 12/20/2020    CREATININE 0.53 12/20/2020        Lab Results    Component Value Date    WBC 5.6 12/20/2020    HEMOGLOBIN 14.8 12/20/2020    HEMATOCRIT 46.7 12/20/2020    PLATELETCT 224 12/20/2020        Total time of the discharge process exceeds 35 minutes.

## 2020-12-20 NOTE — ED NOTES
Med rec complete per return call from Pt's daughter and phone calls to Pt's pharmacies [facility] on [address] () and [facility] ().  Per Pt's daughter, Pt stopped taking atenolol on 12/17/2020 and has switched to metoprolol; per pharmacy, this is a 25 mg tablet with instructions to take 1/2 tablet twice daily.  Allergies reviewed with Pt's daughter. No known drug allergies.  Pt takes ASPIRIN and ELIQUIS.

## 2020-12-20 NOTE — PROGRESS NOTES
Patient discharged at this time will all belongings and paperwork. All questions answered. Patient discharged via wheelchair accompanied by staff to friends car.

## 2020-12-20 NOTE — PROGRESS NOTES
Report to MD patient is now Covid +. Education given to patient on covid results and need to move her to tele 7 covid unit. Patient verbalized understanding. She is asymptomatic.  MD aware. Admitted for Afib RVR given dilt and Lopressor in ED . Denies pain except for left ankle that has been swollen and tender to her for a few weeks. Afib, Room air. LS diminised clear. AOX4. Pressure ulcer concern to right buttocks blanchable. Waffle mattress applied to off load.

## 2020-12-20 NOTE — ED NOTES
"Pt transferred to hospital bed.  Pt c/o wound on her \"butt\", pt refusing to allow staff to look at the area.  Pt states it's been her since I left the hospital but no one will do anything about it\", again staff encouraged pt to exam the area.  Pt refused, will attempt again later.  Waffle ordered for the hospital bed  "

## 2020-12-20 NOTE — PROGRESS NOTES
I received page from RN that patient found to have COVID-19 positive.  She admitted on telemetry due to A. fib with RVR.  I placed transfer orders to COVID-19 floor with telemetry.  I discussed plan of care with bedside RN.  Placed order for contact and droplet precaution due to COVID-19.

## 2020-12-20 NOTE — ASSESSMENT & PLAN NOTE
Recently diagnosed at [facility]  Cardiology on board; appreciated  Per cardiology recs: continue metoprolol 25 mg twice daily, titrate up as possible with goal HR < 110  Continue home Eliquis.  Monitor on telemetry  Stop home aspirin

## 2020-12-20 NOTE — DISCHARGE INSTRUCTIONS
Discharge Instructions    Discharged to home by car with friend. Discharged via wheelchair, hospital escort: Refused.  Special equipment needed: Not Applicable    Be sure to schedule a follow-up appointment with your primary care doctor or any specialists as instructed.     Discharge Plan:   Influenza Vaccine Indication: Patient Refuses    I understand that a diet low in cholesterol, fat, and sodium is recommended for good health. Unless I have been given specific instructions below for another diet, I accept this instruction as my diet prescription.   Other diet: Cardia    Special Instructions:    · Is patient discharged on Warfarin / Coumadin?   No     Depression / Suicide Risk    As you are discharged from this [facility] facility, it is important to learn how to keep safe from harming yourself.    Recognize the warning signs:  · Abrupt changes in personality, positive or negative- including increase in energy   · Giving away possessions  · Change in eating patterns- significant weight changes-  positive or negative  · Change in sleeping patterns- unable to sleep or sleeping all the time   · Unwillingness or inability to communicate  · Depression  · Unusual sadness, discouragement and loneliness  · Talk of wanting to die  · Neglect of personal appearance   · Rebelliousness- reckless behavior  · Withdrawal from people/activities they love  · Confusion- inability to concentrate     If you or a loved one observes any of these behaviors or has concerns about self-harm, here's what you can do:  · Talk about it- your feelings and reasons for harming yourself  · Remove any means that you might use to hurt yourself (examples: pills, rope, extension cords, firearm)  · Get professional help from the community (Mental Health, Substance Abuse, psychological counseling)  · Do not be alone:Call your Safe Contact- someone whom you trust who will be there for you.  · Call your local CRISIS HOTLINE 148-1478 or  887.425.6100  · Call your local Children's Mobile Crisis Response Team [place] (174) 528-8013 or www.[employer]  · Call the toll free National Suicide Prevention Hotlines   · National Suicide Prevention Lifeline 054-310-LFEU (8948)  · [employer] Line Network 800-SUICIDE (149-3067)    Atrial Fibrillation    Atrial fibrillation is a type of heartbeat that is irregular or fast (rapid). If you have this condition, your heart beats without any order. This makes it hard for your heart to pump blood in a normal way. Having this condition gives you more risk for stroke, heart failure, and other heart problems.  Atrial fibrillation may start all of a sudden and then stop on its own, or it may become a long-lasting problem.  What are the causes?  This condition may be caused by heart conditions, such as:  · High blood pressure.  · Heart failure.  · Heart valve disease.  · Heart surgery.  Other causes include:  · Pneumonia.  · Obstructive sleep apnea.  · Lung cancer.  · Thyroid disease.  · Drinking too much alcohol.  Sometimes the cause is not known.  What increases the risk?  You are more likely to develop this condition if:  · You smoke.  · You are older.  · You have diabetes.  · You are overweight.  · You have a family history of this condition.  · You exercise often and hard.  What are the signs or symptoms?  Common symptoms of this condition include:  · A feeling like your heart is beating very fast.  · Chest pain.  · Feeling short of breath.  · Feeling light-headed or weak.  · Getting tired easily.  Follow these instructions at home:  Medicines  · Take over-the-counter and prescription medicines only as told by your doctor.  · If your doctor gives you a blood-thinning medicine, take it exactly as told. Taking too much of it can cause bleeding. Taking too little of it does not protect you against clots. Clots can cause a stroke.  Lifestyle         · Do not use any tobacco products. These include cigarettes,  "chewing tobacco, and e-cigarettes. If you need help quitting, ask your doctor.  · Do not drink alcohol.  · Do not drink beverages that have caffeine. These include coffee, soda, and tea.  · Follow diet instructions as told by your doctor.  · Exercise regularly as told by your doctor.  General instructions  · If you have a condition that causes breathing to stop for a short period of time (apnea), treat it as told by your doctor.  · Keep a healthy weight. Do not use diet pills unless your doctor says they are safe for you. Diet pills may make heart problems worse.  · Keep all follow-up visits as told by your doctor. This is important.  Contact a doctor if:  · You notice a change in the speed, rhythm, or strength of your heartbeat.  · You are taking a blood-thinning medicine and you see more bruising.  · You get tired more easily when you move or exercise.  · You have a sudden change in weight.  Get help right away if:    · You have pain in your chest or your belly (abdomen).  · You have trouble breathing.  · You have blood in your vomit, poop, or pee (urine).  · You have any signs of a stroke. \"BE FAST\" is an easy way to remember the main warning signs:  ? B - Balance. Signs are dizziness, sudden trouble walking, or loss of balance.  ? E - Eyes. Signs are trouble seeing or a change in how you see.  ? F - Face. Signs are sudden weakness or loss of feeling in the face, or the face or eyelid drooping on one side.  ? A - Arms. Signs are weakness or loss of feeling in an arm. This happens suddenly and usually on one side of the body.  ? S - Speech. Signs are sudden trouble speaking, slurred speech, or trouble understanding what people say.  ? T - Time. Time to call emergency services. Write down what time symptoms started.  · You have other signs of a stroke, such as:  ? A sudden, very bad headache with no known cause.  ? Feeling sick to your stomach (nausea).  ? Throwing up (vomiting).  ? Jerky movements you cannot " control (seizure).  These symptoms may be an emergency. Do not wait to see if the symptoms will go away. Get medical help right away. Call your local emergency services (911 in the [place] Do not drive yourself to the hospital.  Summary  · Atrial fibrillation is a type of heartbeat that is irregular or fast (rapid).  · You are at higher risk of this condition if you smoke, are older, have diabetes, or are overweight.  · Follow your doctor's instructions about medicines, diet, exercise, and follow-up visits.  · Get help right away if you think that you have signs of a stroke.  This information is not intended to replace advice given to you by your health care provider. Make sure you discuss any questions you have with your health care provider.  Document Released: 09/26/2009 Document Revised: 02/21/2019 Document Reviewed: 02/08/2019  [employer] Patient Education © 2020 [employer]      COVID-19  COVID-19 is a respiratory infection that is caused by a virus called severe acute respiratory syndrome coronavirus 2 (SARS-CoV-2). The disease is also known as coronavirus disease or novel coronavirus. In some people, the virus may not cause any symptoms. In others, it may cause a serious infection. The infection can get worse quickly and can lead to complications, such as:  · Pneumonia, or infection of the lungs.  · Acute respiratory distress syndrome or ARDS. This is fluid build-up in the lungs.  · Acute respiratory failure. This is a condition in which there is not enough oxygen passing from the lungs to the body.  · Sepsis or septic shock. This is a serious bodily reaction to an infection.  · Blood clotting problems.  · Secondary infections due to bacteria or fungus.  The virus that causes COVID-19 is contagious. This means that it can spread from person to person through droplets from coughs and sneezes (respiratory secretions).  What are the causes?  This illness is caused by a virus. You may catch the virus  by:  · Breathing in droplets from an infected person's cough or sneeze.  · Touching something, like a table or a doorknob, that was exposed to the virus (contaminated) and then touching your mouth, nose, or eyes.  What increases the risk?  Risk for infection  You are more likely to be infected with this virus if you:  · Live in or travel to an area with a COVID-19 outbreak.  · Come in contact with a sick person who recently traveled to an area with a COVID-19 outbreak.  · Provide care for or live with a person who is infected with COVID-19.  Risk for serious illness  You are more likely to become seriously ill from the virus if you:  · Are 65 years of age or older.  · Have a long-term disease that lowers your body's ability to fight infection (immunocompromised).  · Live in a nursing home or long-term care facility.  · Have a long-term (chronic) disease such as:  ? Chronic lung disease, including chronic obstructive pulmonary disease or asthma  ? Heart disease.  ? Diabetes.  ? Chronic kidney disease.  ? Liver disease.  · Are obese.  What are the signs or symptoms?  Symptoms of this condition can range from mild to severe. Symptoms may appear any time from 2 to 14 days after being exposed to the virus. They include:  · A fever.  · A cough.  · Difficulty breathing.  · Chills.  · Muscle pains.  · A sore throat.  · Loss of taste or smell.  Some people may also have stomach problems, such as nausea, vomiting, or diarrhea.  Other people may not have any symptoms of COVID-19.  How is this diagnosed?  This condition may be diagnosed based on:  · Your signs and symptoms, especially if:  ? You live in an area with a COVID-19 outbreak.  ? You recently traveled to or from an area where the virus is common.  ? You provide care for or live with a person who was diagnosed with COVID-19.  · A physical exam.  · Lab tests, which may include:  ? A nasal swab to take a sample of fluid from your nose.  ? A throat swab to take a sample  of fluid from your throat.  ? A sample of mucus from your lungs (sputum).  ? Blood tests.  · Imaging tests, which may include, X-rays, CT scan, or ultrasound.  How is this treated?  At present, there is no medicine to treat COVID-19. Medicines that treat other diseases are being used on a trial basis to see if they are effective against COVID-19.  Your health care provider will talk with you about ways to treat your symptoms. For most people, the infection is mild and can be managed at home with rest, fluids, and over-the-counter medicines.  Treatment for a serious infection usually takes places in a hospital intensive care unit (ICU). It may include one or more of the following treatments. These treatments are given until your symptoms improve.  · Receiving fluids and medicines through an IV.  · Supplemental oxygen. Extra oxygen is given through a tube in the nose, a face mask, or a ewing.  · Positioning you to lie on your stomach (prone position). This makes it easier for oxygen to get into the lungs.  · Continuous positive airway pressure (CPAP) or bi-level positive airway pressure (BPAP) machine. This treatment uses mild air pressure to keep the airways open. A tube that is connected to a motor delivers oxygen to the body.  · Ventilator. This treatment moves air into and out of the lungs by using a tube that is placed in your windpipe.  · Tracheostomy. This is a procedure to create a hole in the neck so that a breathing tube can be inserted.  · Extracorporeal membrane oxygenation (ECMO). This procedure gives the lungs a chance to recover by taking over the functions of the heart and lungs. It supplies oxygen to the body and removes carbon dioxide.  Follow these instructions at home:  Lifestyle  · If you are sick, stay home except to get medical care. Your health care provider will tell you how long to stay home. Call your health care provider before you go for medical care.  · Rest at home as told by your health  care provider.  · Do not use any products that contain nicotine or tobacco, such as cigarettes, e-cigarettes, and chewing tobacco. If you need help quitting, ask your health care provider.  · Return to your normal activities as told by your health care provider. Ask your health care provider what activities are safe for you.  General instructions  · Take over-the-counter and prescription medicines only as told by your health care provider.  · Drink enough fluid to keep your urine pale yellow.  · Keep all follow-up visits as told by your health care provider. This is important.  How is this prevented?       There is no vaccine to help prevent COVID-19 infection. However, there are steps you can take to protect yourself and others from this virus.  To protect yourself:   · Do not travel to areas where COVID-19 is a risk. The areas where COVID-19 is reported change often. To identify high-risk areas and travel restrictions, check the [facility] travel website: wwwnc.cdc.gov/travel/notices  · If you live in, or must travel to, an area where COVID-19 is a risk, take precautions to avoid infection.  ? Stay away from people who are sick.  ? Wash your hands often with soap and water for 20 seconds. If soap and water are not available, use an alcohol-based hand .  ? Avoid touching your mouth, face, eyes, or nose.  ? Avoid going out in public, follow guidance from your state and local health authorities.  ? If you must go out in public, wear a cloth face covering or face mask.  ? Disinfect objects and surfaces that are frequently touched every day. This may include:  § Counters and tables.  § Doorknobs and light switches.  § Sinks and faucets.  § Electronics, such as phones, remote controls, keyboards, computers, and tablets.  To protect others:  If you have symptoms of COVID-19, take steps to prevent the virus from spreading to others.  · If you think you have a COVID-19 infection, contact your health care provider right  away. Tell your health care team that you think you may have a COVID-19 infection.  · Stay home. Leave your house only to seek medical care. Do not use public transport.  · Do not travel while you are sick.  · Wash your hands often with soap and water for 20 seconds. If soap and water are not available, use alcohol-based hand .  · Stay away from other members of your household. Let healthy household members care for children and pets, if possible. If you have to care for children or pets, wash your hands often and wear a mask. If possible, stay in your own room, separate from others. Use a different bathroom.  · Make sure that all people in your household wash their hands well and often.  · Cough or sneeze into a tissue or your sleeve or elbow. Do not cough or sneeze into your hand or into the air.  · Wear a cloth face covering or face mask.  Where to find more information  · Centers for Disease Control and Prevention: www.cdc.gov/coronavirus/2019-ncov/index.html  · World Health Organization: www.who.int/health-topics/coronavirus  Contact a health care provider if:  · You live in or have traveled to an area where COVID-19 is a risk and you have symptoms of the infection.  · You have had contact with someone who has COVID-19 and you have symptoms of the infection.  Get help right away if:  · You have trouble breathing.  · You have pain or pressure in your chest.  · You have confusion.  · You have bluish lips and fingernails.  · You have difficulty waking from sleep.  · You have symptoms that get worse.  These symptoms may represent a serious problem that is an emergency. Do not wait to see if the symptoms will go away. Get medical help right away. Call your local emergency services (911 in the [place] Do not drive yourself to the hospital. Let the emergency medical personnel know if you think you have COVID-19.  Summary  · COVID-19 is a respiratory infection that is caused by a virus. It is also known as  coronavirus disease or novel coronavirus. It can cause serious infections, such as pneumonia, acute respiratory distress syndrome, acute respiratory failure, or sepsis.  · The virus that causes COVID-19 is contagious. This means that it can spread from person to person through droplets from coughs and sneezes.  · You are more likely to develop a serious illness if you are 65 years of age or older, have a weak immunity, live in a nursing home, or have chronic disease.  · There is no medicine to treat COVID-19. Your health care provider will talk with you about ways to treat your symptoms.  · Take steps to protect yourself and others from infection. Wash your hands often and disinfect objects and surfaces that are frequently touched every day. Stay away from people who are sick and wear a mask if you are sick.  This information is not intended to replace advice given to you by your health care provider. Make sure you discuss any questions you have with your health care provider.  Document Released: 01/23/2020 Document Revised: 05/14/2020 Document Reviewed: 01/23/2020  [employer] Patient Education © 2020 [employer]

## 2020-12-20 NOTE — H&P
Hospital Medicine History & Physical Note    Date of Service  12/19/2020    Primary Care Physician  Denver J Miller, M.D.    Consultants  Cardiology - Dr. Norton.     Code Status  Full Code    Chief Complaint  Chief Complaint   Patient presents with   • Lightheadedness     x 2 hours; pt was recently discharged w dx of CVA, no lasting deficits; pt started eliquis on Thursday; pt received 300 mL fluid per EMS; FSBS 111 per EMS       History of Presenting Illness  86 y.o. with past medical history hypertension, recent CVA (11/19/2020 s/p alteplase, s/p successful thrombectomy with improvement, minimal residual deficit) who was sent to the ED this morning by her home health nurse when she was found to be in A. fib with RVR.  Patient also recently admitted to [facility] for pneumonia during which she was found to be in afib and started on Eliquis.  In the ED, patient received IV diltiazem, which brought her heart rate to 100s.  Denies chest pain, palpitations, shortness of breath, abdominal pain, changes in mentation, fever/chills.    Cardiology was consulted, and I am asked to admit the patient for further management.    Review of Systems  12 point review of systems negative except as per HPI.    Past Medical History   has a past medical history of Hypertension and Post-menopause (10/22/2014).    Surgical History   has a past surgical history that includes abdominal hysterectomy total and open reduction.     Family History  family history includes Cancer in her mother and sister; Genetic Disorder in her brother; No Known Problems in her father, maternal grandfather, maternal grandmother, paternal grandfather, and paternal grandmother.     Social History   reports that she quit smoking about 59 years ago. Her smoking use included cigarettes. She has a 2.50 pack-year smoking history. She has never used smokeless tobacco. She reports current alcohol use. She reports that she does not use drugs.    Allergies  No Known  Allergies    Medications  Prior to Admission Medications   Prescriptions Last Dose Informant Patient Reported? Taking?   amoxicillin-clavulanate (AUGMENTIN) 400-57 MG/5ML Recon Susp suspension 12/19/2020 at 0700 Patient's Home Pharmacy Yes Yes   Sig: Take 11 mL by mouth 2 times a day. 11 mL = 880-125.4 mg. 10 day course started 12/18/2020.   apixaban (ELIQUIS) 5mg Tab 12/19/2020 at 0700 Patient's Home Pharmacy Yes Yes   Sig: Take 5 mg by mouth 2 Times a Day.   aspirin (ASA) 325 MG Tab 12/19/2020 at 0700 Family Member No No   Sig: Take 1 Tab by mouth every day.   Patient taking differently: Take 81 mg by mouth every day.   atorvastatin (LIPITOR) 40 MG Tab 12/18/2020 at PM Family Member No No   Sig: Take 1 Tab by mouth every evening.   hydroCHLOROthiazide (HYDRODIURIL) 25 MG Tab  Family Member No No   Sig: Take 1 Tab by mouth every day.   metoprolol (LOPRESSOR) 25 MG Tab unknown at unknown Patient's Home Pharmacy Yes Yes   Sig: Take 12.5 mg by mouth 2 times a day. 1/2 tablet = 12.5 mg.      Facility-Administered Medications: None       Physical Exam  Temp:  [36.9 °C (98.4 °F)] 36.9 °C (98.4 °F)  Pulse:  [] 104  Resp:  [17-23] 17  BP: (106-136)/(55-82) 106/81  SpO2:  [93 %-97 %] 96 %    Physical Exam  Vitals signs and nursing note reviewed.   Constitutional:       General: She is not in acute distress.     Appearance: She is not toxic-appearing or diaphoretic.   HENT:      Head: Normocephalic and atraumatic.      Nose: No congestion.      Mouth/Throat:      Mouth: Mucous membranes are moist.      Pharynx: Oropharynx is clear.   Eyes:      General: No scleral icterus.     Conjunctiva/sclera: Conjunctivae normal.   Cardiovascular:      Rate and Rhythm: Tachycardia present. Rhythm irregular.      Heart sounds: No murmur. No gallop.       Comments: Tachycardic to 100s on my exam.  Pulmonary:      Effort: Pulmonary effort is normal. No respiratory distress.      Breath sounds: Normal breath sounds. No stridor. No  wheezing.   Abdominal:      General: Bowel sounds are normal.      Palpations: Abdomen is soft.      Tenderness: There is no abdominal tenderness.   Musculoskeletal:         General: No swelling or tenderness.   Skin:     Capillary Refill: Capillary refill takes less than 2 seconds.      Coloration: Skin is not jaundiced.   Neurological:      Mental Status: She is alert and oriented to person, place, and time. Mental status is at baseline.   Psychiatric:         Mood and Affect: Mood normal.         Behavior: Behavior normal.         Laboratory:  Recent Labs     12/19/20  1406   WBC 7.8   RBC 4.32   HEMOGLOBIN 13.8   HEMATOCRIT 41.0   MCV 94.9   MCH 31.9   MCHC 33.7   RDW 50.1*   PLATELETCT 229   MPV 11.9     Recent Labs     12/19/20  1439   SODIUM 137   POTASSIUM 3.4*   CHLORIDE 102   CO2 21   GLUCOSE 100*   BUN 9   CREATININE 0.61   CALCIUM 9.0     Recent Labs     12/19/20  1439   ALTSGPT 42   ASTSGOT 38   ALKPHOSPHAT 105*   TBILIRUBIN 0.5   GLUCOSE 100*         No results for input(s): NTPROBNP in the last 72 hours.      Recent Labs     12/19/20  1439   TROPONINT 16       Imaging:  DX-CHEST-PORTABLE (1 VIEW)   Final Result      Mild diffuse increased interstitial opacity which may represent interstitial edema or pneumonia. No lobar consolidation is present.            Assessment/Plan:  I anticipate this patient is appropriate for observation status at this time.    * Paroxysmal atrial fibrillation (HCC) with RVR- (present on admission)  Assessment & Plan  Recently diagnosed at [facility].  Cardiology on board; appreciated.  Per cardiology recs: continue metoprolol 25 mg twice daily, titrate up as possible with goal HR < 110.  Continue home Eliquis.   Monitor on telemetry.   Stop home aspirin.     Recent h/o CVA (cerebral vascular accident) (HCC)- (present on admission)  Assessment & Plan  CVA on 11/19/2020 s/p alteplase, s/p successful thrombectomy with improvement, minimal residual deficit.    Afib with RVR discussed by EDP, Dr. Hernandez, with Neurology, Dr. Wei, who is okay with full anticoagulation for the patient.      Dyslipidemia- (present on admission)  Assessment & Plan  Continue home atorvastatin.     Hypokalemia- (present on admission)  Assessment & Plan  Replete to keep K > 4.   Monitor BMP.

## 2021-01-15 DIAGNOSIS — Z23 NEED FOR VACCINATION: ICD-10-CM

## 2023-03-08 NOTE — PROGRESS NOTES
Monitor summary: SR 75-93, AR 0.16, QRS 0.06, QT 0.38, Trig PVCs noted per strip from monitor room.    Is Phototherapy Contraindicated?: No Skyrizi Monitoring Guidelines: A yearly test for tuberculosis is required while taking Skyrizi. Detail Level: Zone Diagnosis (Required): Psoriasis Pregnancy And Lactation Warning Text: The risk during pregnancy and breastfeeding is uncertain with this medication. Skyrizi Dosing: 150 mg SC week 0 and week 4 then every 12 weeks thereafter

## 2023-09-12 ENCOUNTER — APPOINTMENT (RX ONLY)
Dept: URBAN - METROPOLITAN AREA CLINIC 15 | Facility: CLINIC | Age: 88
Setting detail: DERMATOLOGY
End: 2023-09-12

## 2023-09-12 DIAGNOSIS — L30.9 DERMATITIS, UNSPECIFIED: ICD-10-CM

## 2023-09-12 DIAGNOSIS — D485 NEOPLASM OF UNCERTAIN BEHAVIOR OF SKIN: ICD-10-CM

## 2023-09-12 DIAGNOSIS — L60.3 NAIL DYSTROPHY: ICD-10-CM

## 2023-09-12 DIAGNOSIS — D17 BENIGN LIPOMATOUS NEOPLASM: ICD-10-CM | Status: WORSENING

## 2023-09-12 PROBLEM — D48.5 NEOPLASM OF UNCERTAIN BEHAVIOR OF SKIN: Status: ACTIVE | Noted: 2023-09-12

## 2023-09-12 PROBLEM — D17.1 BENIGN LIPOMATOUS NEOPLASM OF SKIN AND SUBCUTANEOUS TISSUE OF TRUNK: Status: ACTIVE | Noted: 2023-09-12

## 2023-09-12 PROCEDURE — ? ADDITIONAL NOTES

## 2023-09-12 PROCEDURE — ? MEDICATION COUNSELING

## 2023-09-12 PROCEDURE — ? NAIL CLIPPING FOR PATHOLOGY

## 2023-09-12 PROCEDURE — ? PRESCRIPTION

## 2023-09-12 PROCEDURE — ? COUNSELING

## 2023-09-12 PROCEDURE — ? DEFER

## 2023-09-12 PROCEDURE — 99204 OFFICE O/P NEW MOD 45 MIN: CPT

## 2023-09-12 RX ORDER — HYDROCORTISONE 25 MG/G
QD CREAM TOPICAL
Qty: 30 | Refills: 1 | Status: ERX | COMMUNITY
Start: 2023-09-12

## 2023-09-12 RX ADMIN — HYDROCORTISONE QD: 25 CREAM TOPICAL at 00:00

## 2023-09-12 ASSESSMENT — LOCATION DETAILED DESCRIPTION DERM
LOCATION DETAILED: RIGHT INDEX FINGERNAIL
LOCATION DETAILED: RIGHT CENTRAL MALAR CHEEK
LOCATION DETAILED: LEFT CENTRAL MALAR CHEEK
LOCATION DETAILED: LEFT INDEX FINGERNAIL
LOCATION DETAILED: RIGHT SUPERIOR UPPER BACK
LOCATION DETAILED: RIGHT GREAT TOENAIL
LOCATION DETAILED: LEFT SUPRAPUBIC SKIN
LOCATION DETAILED: LEFT GREAT TOENAIL

## 2023-09-12 ASSESSMENT — LOCATION ZONE DERM
LOCATION ZONE: TRUNK
LOCATION ZONE: FINGERNAIL
LOCATION ZONE: TOENAIL
LOCATION ZONE: FACE

## 2023-09-12 ASSESSMENT — LOCATION SIMPLE DESCRIPTION DERM
LOCATION SIMPLE: RIGHT INDEX FINGERNAIL
LOCATION SIMPLE: RIGHT UPPER BACK
LOCATION SIMPLE: LEFT GREAT TOE
LOCATION SIMPLE: GROIN
LOCATION SIMPLE: LEFT CHEEK
LOCATION SIMPLE: RIGHT CHEEK
LOCATION SIMPLE: RIGHT GREAT TOE
LOCATION SIMPLE: LEFT INDEX FINGERNAIL

## 2023-09-12 NOTE — PROCEDURE: ADDITIONAL NOTES
Additional Notes: Was told by manicurist that she had nail fungus, treated with Jublia and bleach water\\nComplains of ridges, breaking, and discoloration\\n\\nonychorrhexis predom,perhaps mild yellowing of nails on R great toenail. do not favor onycho but pt would like to tx if its there so will clip for pas.
Detail Level: Simple
Render Risk Assessment In Note?: no
Additional Notes: favor mild icd, perhaps 2/2 vit c cream. avoid for now. start hct 2.5 crm bid prn
Additional Notes: offered ln2 but she declined; she would like to monitor for now.

## 2023-09-12 NOTE — PROCEDURE: NAIL CLIPPING FOR PATHOLOGY
Detail Level: Detailed
Render Path Notes In Note?: No
Lab: [id number]
Lab Facility: [id number]
Billing Type: Third-Party Bill

## 2023-09-12 NOTE — PROCEDURE: DEFER
Size Of Lesion In Cm (Optional): 0
Introduction Text (Please End With A Colon): The following procedure was deferred:
Detail Level: Detailed
X Size Of Lesion In Cm (Optional): 9

## 2023-09-12 NOTE — PROCEDURE: MEDICATION COUNSELING
Solaraze Counseling:  I discussed with the patient the risks of Solaraze including but not limited to erythema, scaling, itching, weeping, crusting, and pain.
Siliq Counseling:  I discussed with the patient the risks of Siliq including but not limited to new or worsening depression, suicidal thoughts and behavior, immunosuppression, malignancy, posterior leukoencephalopathy syndrome, and serious infections.  The patient understands that monitoring is required including a PPD at baseline and must alert us or the primary physician if symptoms of infection or other concerning signs are noted. There is also a special program designed to monitor depression which is required with Siliq.
Opzelura Pregnancy And Lactation Text: There is insufficient data to evaluate drug-associated risk for major birth defects, miscarriage, or other adverse maternal or fetal outcomes.  There is a pregnancy registry that monitors pregnancy outcomes in pregnant persons exposed to the medication during pregnancy.  It is unknown if this medication is excreted in breast milk.  Do not breastfeed during treatment and for about 4 weeks after the last dose.
Aklief counseling:  Patient advised to apply a pea-sized amount only at bedtime and wait 30 minutes after washing their face before applying.  If too drying, patient may add a non-comedogenic moisturizer.  The most commonly reported side effects including irritation, redness, scaling, dryness, stinging, burning, itching, and increased risk of sunburn.  The patient verbalized understanding of the proper use and possible adverse effects of retinoids.  All of the patient's questions and concerns were addressed.
Doxepin Counseling:  Patient advised that the medication is sedating and not to drive a car after taking this medication. Patient informed of potential adverse effects including but not limited to dry mouth, urinary retention, and blurry vision.  The patient verbalized understanding of the proper use and possible adverse effects of doxepin.  All of the patient's questions and concerns were addressed.
Imiquimod Counseling:  I discussed with the patient the risks of imiquimod including but not limited to erythema, scaling, itching, weeping, crusting, and pain.  Patient understands that the inflammatory response to imiquimod is variable from person to person and was educated regarded proper titration schedule.  If flu-like symptoms develop, patient knows to discontinue the medication and contact us.
Clofazimine Pregnancy And Lactation Text: This medication is Pregnancy Category C and isn't considered safe during pregnancy. It is excreted in breast milk.
5-Fu Pregnancy And Lactation Text: This medication is Pregnancy Category X and contraindicated in pregnancy and in women who may become pregnant. It is unknown if this medication is excreted in breast milk.
Hydroxychloroquine Counseling:  I discussed with the patient that a baseline ophthalmologic exam is needed at the start of therapy and every year thereafter while on therapy. A CBC may also be warranted for monitoring.  The side effects of this medication were discussed with the patient, including but not limited to agranulocytosis, aplastic anemia, seizures, rashes, retinopathy, and liver toxicity. Patient instructed to call the office should any adverse effect occur.  The patient verbalized understanding of the proper use and possible adverse effects of Plaquenil.  All the patient's questions and concerns were addressed.
Enbrel Pregnancy And Lactation Text: This medication is Pregnancy Category B and is considered safe during pregnancy. It is unknown if this medication is excreted in breast milk.
Acitretin Counseling:  I discussed with the patient the risks of acitretin including but not limited to hair loss, dry lips/skin/eyes, liver damage, hyperlipidemia, depression/suicidal ideation, photosensitivity.  Serious rare side effects can include but are not limited to pancreatitis, pseudotumor cerebri, bony changes, clot formation/stroke/heart attack.  Patient understands that alcohol is contraindicated since it can result in liver toxicity and significantly prolong the elimination of the drug by many years.
Birth Control Pills Counseling: Birth Control Pill Counseling: I discussed with the patient the potential side effects of OCPs including but not limited to increased risk of stroke, heart attack, thrombophlebitis, deep venous thrombosis, hepatic adenomas, breast changes, GI upset, headaches, and depression.  The patient verbalized understanding of the proper use and possible adverse effects of OCPs. All of the patient's questions and concerns were addressed.
Adbry Counseling: I discussed with the patient the risks of tralokinumab including but not limited to eye infection and irritation, cold sores, injection site reactions, worsening of asthma, allergic reactions and increased risk of parasitic infection.  Live vaccines should be avoided while taking tralokinumab. The patient understands that monitoring is required and they must alert us or the primary physician if symptoms of infection or other concerning signs are noted.
Metronidazole Pregnancy And Lactation Text: This medication is Pregnancy Category B and considered safe during pregnancy.  It is also excreted in breast milk.
Libtayo Counseling- I discussed with the patient the risks of Libtayo including but not limited to nausea, vomiting, diarrhea, and bone or muscle pain.  The patient verbalized understanding of the proper use and possible adverse effects of Libtayo.  All of the patient's questions and concerns were addressed.
Cephalexin Counseling: I counseled the patient regarding use of cephalexin as an antibiotic for prophylactic and/or therapeutic purposes. Cephalexin (commonly prescribed under brand name Keflex) is a cephalosporin antibiotic which is active against numerous classes of bacteria, including most skin bacteria. Side effects may include nausea, diarrhea, gastrointestinal upset, rash, hives, yeast infections, and in rare cases, hepatitis, kidney disease, seizures, fever, confusion, neurologic symptoms, and others. Patients with severe allergies to penicillin medications are cautioned that there is about a 10% incidence of cross-reactivity with cephalosporins. When possible, patients with penicillin allergies should use alternatives to cephalosporins for antibiotic therapy.
Cyclophosphamide Pregnancy And Lactation Text: This medication is Pregnancy Category D and it isn't considered safe during pregnancy. This medication is excreted in breast milk.
Tetracycline Counseling: Patient counseled regarding possible photosensitivity and increased risk for sunburn.  Patient instructed to avoid sunlight, if possible.  When exposed to sunlight, patients should wear protective clothing, sunglasses, and sunscreen.  The patient was instructed to call the office immediately if the following severe adverse effects occur:  hearing changes, easy bruising/bleeding, severe headache, or vision changes.  The patient verbalized understanding of the proper use and possible adverse effects of tetracycline.  All of the patient's questions and concerns were addressed. Patient understands to avoid pregnancy while on therapy due to potential birth defects.
Colchicine Counseling:  Patient counseled regarding adverse effects including but not limited to stomach upset (nausea, vomiting, stomach pain, or diarrhea).  Patient instructed to limit alcohol consumption while taking this medication.  Colchicine may reduce blood counts especially with prolonged use.  The patient understands that monitoring of kidney function and blood counts may be required, especially at baseline. The patient verbalized understanding of the proper use and possible adverse effects of colchicine.  All of the patient's questions and concerns were addressed.
Itraconazole Counseling:  I discussed with the patient the risks of itraconazole including but not limited to liver damage, nausea/vomiting, neuropathy, and severe allergy.  The patient understands that this medication is best absorbed when taken with acidic beverages such as non-diet cola or ginger ale.  The patient understands that monitoring is required including baseline LFTs and repeat LFTs at intervals.  The patient understands that they are to contact us or the primary physician if concerning signs are noted.
Aklief Pregnancy And Lactation Text: It is unknown if this medication is safe to use during pregnancy.  It is unknown if this medication is excreted in breast milk.  Breastfeeding women should use the topical cream on the smallest area of the skin for the shortest time needed while breastfeeding.  Do not apply to nipple and areola.
Hydroxychloroquine Pregnancy And Lactation Text: This medication has been shown to cause fetal harm but it isn't assigned a Pregnancy Risk Category. There are small amounts excreted in breast milk.
Oral Minoxidil Counseling- I discussed with the patient the risks of oral minoxidil including but not limited to shortness of breath, swelling of the feet or ankles, dizziness, lightheadedness, unwanted hair growth and allergic reaction.  The patient verbalized understanding of the proper use and possible adverse effects of oral minoxidil.  All of the patient's questions and concerns were addressed.
Topical Ketoconazole Counseling: Patient counseled that this medication may cause skin irritation or allergic reactions.  In the event of skin irritation, the patient was advised to reduce the amount of the drug applied or use it less frequently.   The patient verbalized understanding of the proper use and possible adverse effects of ketoconazole.  All of the patient's questions and concerns were addressed.
Solaraze Pregnancy And Lactation Text: This medication is Pregnancy Category B and is considered safe. There is some data to suggest avoiding during the third trimester. It is unknown if this medication is excreted in breast milk.
Sski Pregnancy And Lactation Text: This medication is Pregnancy Category D and isn't considered safe during pregnancy. It is excreted in breast milk.
Humira Counseling:  I discussed with the patient the risks of adalimumab including but not limited to myelosuppression, immunosuppression, autoimmune hepatitis, demyelinating diseases, lymphoma, and serious infections.  The patient understands that monitoring is required including a PPD at baseline and must alert us or the primary physician if symptoms of infection or other concerning signs are noted.
Birth Control Pills Pregnancy And Lactation Text: This medication should be avoided if pregnant and for the first 30 days post-partum.
Picato Counseling:  I discussed with the patient the risks of Picato including but not limited to erythema, scaling, itching, weeping, crusting, and pain.
Imiquimod Pregnancy And Lactation Text: This medication is Pregnancy Category C. It is unknown if this medication is excreted in breast milk.
Taltz Counseling: I discussed with the patient the risks of ixekizumab including but not limited to immunosuppression, serious infections, worsening of inflammatory bowel disease and drug reactions.  The patient understands that monitoring is required including a PPD at baseline and must alert us or the primary physician if symptoms of infection or other concerning signs are noted.
Drysol Counseling:  I discussed with the patient the risks of drysol/aluminum chloride including but not limited to skin rash, itching, irritation, burning.
Olumiant Counseling: I discussed with the patient the risks of Olumiant therapy including but not limited to upper respiratory tract infections, shingles, cold sores, and nausea. Live vaccines should be avoided.  This medication has been linked to serious infections; higher rate of mortality; malignancy and lymphoproliferative disorders; major adverse cardiovascular events; thrombosis; gastrointestinal perforations; neutropenia; lymphopenia; anemia; liver enzyme elevations; and lipid elevations.
Adbry Pregnancy And Lactation Text: It is unknown if this medication will adversely affect pregnancy or breast feeding.
Cyclosporine Counseling:  I discussed with the patient the risks of cyclosporine including but not limited to hypertension, gingival hyperplasia,myelosuppression, immunosuppression, liver damage, kidney damage, neurotoxicity, lymphoma, and serious infections. The patient understands that monitoring is required including baseline blood pressure, CBC, CMP, lipid panel and uric acid, and then 1-2 times monthly CMP and blood pressure.
Doxepin Pregnancy And Lactation Text: This medication is Pregnancy Category C and it isn't known if it is safe during pregnancy. It is also excreted in breast milk and breast feeding isn't recommended.
Opioid Counseling: I discussed with the patient the potential side effects of opioids including but not limited to addiction, altered mental status, and depression. I stressed avoiding alcohol, benzodiazepines, muscle relaxants and sleep aids unless specifically okayed by a physician. The patient verbalized understanding of the proper use and possible adverse effects of opioids. All of the patient's questions and concerns were addressed. They were instructed to flush the remaining pills down the toilet if they did not need them for pain.
Acitretin Pregnancy And Lactation Text: This medication is Pregnancy Category X and should not be given to women who are pregnant or may become pregnant in the future. This medication is excreted in breast milk.
Minocycline Counseling: Patient advised regarding possible photosensitivity and discoloration of the teeth, skin, lips, tongue and gums.  Patient instructed to avoid sunlight, if possible.  When exposed to sunlight, patients should wear protective clothing, sunglasses, and sunscreen.  The patient was instructed to call the office immediately if the following severe adverse effects occur:  hearing changes, easy bruising/bleeding, severe headache, or vision changes.  The patient verbalized understanding of the proper use and possible adverse effects of minocycline.  All of the patient's questions and concerns were addressed.
Cephalexin Pregnancy And Lactation Text: This medication is Pregnancy Category B and considered safe during pregnancy.  It is also excreted in breast milk but can be used safely for shorter doses.
Winlevi Counseling:  I discussed with the patient the risks of topical clascoterone including but not limited to erythema, scaling, itching, and stinging. Patient voiced their understanding.
Thalidomide Counseling: I discussed with the patient the risks of thalidomide including but not limited to birth defects, anxiety, weakness, chest pain, dizziness, cough and severe allergy.
Azelaic Acid Counseling: Patient counseled that medicine may cause skin irritation and to avoid applying near the eyes.  In the event of skin irritation, the patient was advised to reduce the amount of the drug applied or use it less frequently.   The patient verbalized understanding of the proper use and possible adverse effects of azelaic acid.  All of the patient's questions and concerns were addressed.
Benzoyl Peroxide Counseling: Patient counseled that medicine may cause skin irritation and bleach clothing.  In the event of skin irritation, the patient was advised to reduce the amount of the drug applied or use it less frequently.   The patient verbalized understanding of the proper use and possible adverse effects of benzoyl peroxide.  All of the patient's questions and concerns were addressed.
Libtayo Pregnancy And Lactation Text: This medication is contraindicated in pregnancy and when breast feeding.
Oral Minoxidil Pregnancy And Lactation Text: This medication should only be used when clearly needed if you are pregnant, attempting to become pregnant or breast feeding.
Tetracycline Pregnancy And Lactation Text: This medication is Pregnancy Category D and not consider safe during pregnancy. It is also excreted in breast milk.
Itraconazole Pregnancy And Lactation Text: This medication is Pregnancy Category C and it isn't know if it is safe during pregnancy. It is also excreted in breast milk.
Klisyri Counseling:  I discussed with the patient the risks of Klisyri including but not limited to erythema, scaling, itching, weeping, crusting, and pain.
Spironolactone Counseling: Patient advised regarding risks of diarrhea, abdominal pain, hyperkalemia, birth defects (for female patients), liver toxicity and renal toxicity. The patient may need blood work to monitor liver and kidney function and potassium levels while on therapy. The patient verbalized understanding of the proper use and possible adverse effects of spironolactone.  All of the patient's questions and concerns were addressed.
Soolantra Counseling: I discussed with the patients the risks of topial Soolantra. This is a medicine which decreases the number of mites and inflammation in the skin. You experience burning, stinging, eye irritation or allergic reactions.  Please call our office if you develop any problems from using this medication.
Topical Ketoconazole Pregnancy And Lactation Text: This medication is Pregnancy Category B and is considered safe during pregnancy. It is unknown if it is excreted in breast milk.
Low Dose Naltrexone Counseling- I discussed with the patient the potential risks and side effects of low dose naltrexone including but not limited to: more vivid dreams, headaches, nausea, vomiting, abdominal pain, fatigue, dizziness, and anxiety.
Olumiant Pregnancy And Lactation Text: Based on animal studies, Olumiant may cause embryo-fetal harm when administered to pregnant women.  The medication should not be used in pregnancy.  Breastfeeding is not recommended during treatment.
Cimzia Counseling:  I discussed with the patient the risks of Cimzia including but not limited to immunosuppression, allergic reactions and infections.  The patient understands that monitoring is required including a PPD at baseline and must alert us or the primary physician if symptoms of infection or other concerning signs are noted.
Hydroxyzine Counseling: Patient advised that the medication is sedating and not to drive a car after taking this medication.  Patient informed of potential adverse effects including but not limited to dry mouth, urinary retention, and blurry vision.  The patient verbalized understanding of the proper use and possible adverse effects of hydroxyzine.  All of the patient's questions and concerns were addressed.
Drysol Pregnancy And Lactation Text: This medication is considered safe during pregnancy and breast feeding.
Clindamycin Counseling: I counseled the patient regarding use of clindamycin as an antibiotic for prophylactic and/or therapeutic purposes. Clindamycin is active against numerous classes of bacteria, including skin bacteria. Side effects may include nausea, diarrhea, gastrointestinal upset, rash, hives, yeast infections, and in rare cases, colitis.
Taltz Pregnancy And Lactation Text: The risk during pregnancy and breastfeeding is uncertain with this medication.
Cyclosporine Pregnancy And Lactation Text: This medication is Pregnancy Category C and it isn't know if it is safe during pregnancy. This medication is excreted in breast milk.
Thalidomide Pregnancy And Lactation Text: This medication is Pregnancy Category X and is absolutely contraindicated during pregnancy. It is unknown if it is excreted in breast milk.
Winlevi Pregnancy And Lactation Text: This medication is considered safe during pregnancy and breastfeeding.
Bexarotene Counseling:  I discussed with the patient the risks of bexarotene including but not limited to hair loss, dry lips/skin/eyes, liver abnormalities, hyperlipidemia, pancreatitis, depression/suicidal ideation, photosensitivity, drug rash/allergic reactions, hypothyroidism, anemia, leukopenia, infection, cataracts, and teratogenicity.  Patient understands that they will need regular blood tests to check lipid profile, liver function tests, white blood cell count, thyroid function tests and pregnancy test if applicable.
Benzoyl Peroxide Pregnancy And Lactation Text: This medication is Pregnancy Category C. It is unknown if benzoyl peroxide is excreted in breast milk.
Rinvoq Counseling: I discussed with the patient the risks of Rinvoq therapy including but not limited to upper respiratory tract infections, shingles, cold sores, bronchitis, nausea, cough, fever, acne, and headache. Live vaccines should be avoided.  This medication has been linked to serious infections; higher rate of mortality; malignancy and lymphoproliferative disorders; major adverse cardiovascular events; thrombosis; thrombocytopenia, anemia, and neutropenia; lipid elevations; liver enzyme elevations; and gastrointestinal perforations.
Otezla Counseling: The side effects of Otezla were discussed with the patient, including but not limited to worsening or new depression, weight loss, diarrhea, nausea, upper respiratory tract infection, and headache. Patient instructed to call the office should any adverse effect occur.  The patient verbalized understanding of the proper use and possible adverse effects of Otezla.  All the patient's questions and concerns were addressed.
Protopic Counseling: Patient may experience a mild burning sensation during topical application. Protopic is not approved in children less than 2 years of age. There have been case reports of hematologic and skin malignancies in patients using topical calcineurin inhibitors although causality is questionable.
Odomzo Counseling- I discussed with the patient the risks of Odomzo including but not limited to nausea, vomiting, diarrhea, constipation, weight loss, changes in the sense of taste, decreased appetite, muscle spasms, and hair loss.  The patient verbalized understanding of the proper use and possible adverse effects of Odomzo.  All of the patient's questions and concerns were addressed.
Low Dose Naltrexone Pregnancy And Lactation Text: Naltrexone is pregnancy category C.  There have been no adequate and well-controlled studies in pregnant women.  It should be used in pregnancy only if the potential benefit justifies the potential risk to the fetus.   Limited data indicates that naltrexone is minimally excreted into breastmilk.
Ketoconazole Counseling:   Patient counseled regarding improving absorption with orange juice.  Adverse effects include but are not limited to breast enlargement, headache, diarrhea, nausea, upset stomach, liver function test abnormalities, taste disturbance, and stomach pain.  There is a rare possibility of liver failure that can occur when taking ketoconazole. The patient understands that monitoring of LFTs may be required, especially at baseline. The patient verbalized understanding of the proper use and possible adverse effects of ketoconazole.  All of the patient's questions and concerns were addressed.
Topical Metronidazole Counseling: Metronidazole is a topical antibiotic medication. You may experience burning, stinging, redness, or allergic reactions.  Please call our office if you develop any problems from using this medication.
Tremfya Counseling: I discussed with the patient the risks of guselkumab including but not limited to immunosuppression, serious infections, and drug reactions.  The patient understands that monitoring is required including a PPD at baseline and must alert us or the primary physician if symptoms of infection or other concerning signs are noted.
Elidel Counseling: Patient may experience a mild burning sensation during topical application. Elidel is not approved in children less than 2 years of age. There have been case reports of hematologic and skin malignancies in patients using topical calcineurin inhibitors although causality is questionable.
Hydroxyzine Pregnancy And Lactation Text: This medication is not safe during pregnancy and should not be taken. It is also excreted in breast milk and breast feeding isn't recommended.
Cimzia Pregnancy And Lactation Text: This medication crosses the placenta but can be considered safe in certain situations. Cimzia may be excreted in breast milk.
Spironolactone Pregnancy And Lactation Text: This medication can cause feminization of the male fetus and should be avoided during pregnancy. The active metabolite is also found in breast milk.
Dapsone Counseling: I discussed with the patient the risks of dapsone including but not limited to hemolytic anemia, agranulocytosis, rashes, methemoglobinemia, kidney failure, peripheral neuropathy, headaches, GI upset, and liver toxicity.  Patients who start dapsone require monitoring including baseline LFTs and weekly CBCs for the first month, then every month thereafter.  The patient verbalized understanding of the proper use and possible adverse effects of dapsone.  All of the patient's questions and concerns were addressed.
Soolantra Pregnancy And Lactation Text: This medication is Pregnancy Category C. This medication is considered safe during breast feeding.
Ilumya Counseling: I discussed with the patient the risks of tildrakizumab including but not limited to immunosuppression, malignancy, posterior leukoencephalopathy syndrome, and serious infections.  The patient understands that monitoring is required including a PPD at baseline and must alert us or the primary physician if symptoms of infection or other concerning signs are noted.
Klisyri Pregnancy And Lactation Text: It is unknown if this medication can harm a developing fetus or if it is excreted in breast milk.
Methotrexate Counseling:  Patient counseled regarding adverse effects of methotrexate including but not limited to nausea, vomiting, abnormalities in liver function tests. Patients may develop mouth sores, rash, diarrhea, and abnormalities in blood counts. The patient understands that monitoring is required including LFT's and blood counts.  There is a rare possibility of scarring of the liver and lung problems that can occur when taking methotrexate. Persistent nausea, loss of appetite, pale stools, dark urine, cough, and shortness of breath should be reported immediately. Patient advised to discontinue methotrexate treatment at least three months before attempting to become pregnant.  I discussed the need for folate supplements while taking methotrexate.  These supplements can decrease side effects during methotrexate treatment. The patient verbalized understanding of the proper use and possible adverse effects of methotrexate.  All of the patient's questions and concerns were addressed.
Clindamycin Pregnancy And Lactation Text: This medication can be used in pregnancy if certain situations. Clindamycin is also present in breast milk.
Bexarotene Pregnancy And Lactation Text: This medication is Pregnancy Category X and should not be given to women who are pregnant or may become pregnant. This medication should not be used if you are breast feeding.
Topical Metronidazole Pregnancy And Lactation Text: This medication is Pregnancy Category B and considered safe during pregnancy.  It is also considered safe to use while breastfeeding.
Quinolones Counseling:  I discussed with the patient the risks of fluoroquinolones including but not limited to GI upset, allergic reaction, drug rash, diarrhea, dizziness, photosensitivity, yeast infections, liver function test abnormalities, tendonitis/tendon rupture.
Otezla Pregnancy And Lactation Text: This medication is Pregnancy Category C and it isn't known if it is safe during pregnancy. It is unknown if it is excreted in breast milk.
VTAMA Counseling: I discussed with the patient that VTAMA is not for use in the eyes, mouth or mouth. They should call the office if they develop any signs of allergic reactions to VTAMA. The patient verbalized understanding of the proper use and possible adverse effects of VTAMA.  All of the patient's questions and concerns were addressed.
Ketoconazole Pregnancy And Lactation Text: This medication is Pregnancy Category C and it isn't know if it is safe during pregnancy. It is also excreted in breast milk and breast feeding isn't recommended.
Carac Counseling:  I discussed with the patient the risks of Carac including but not limited to erythema, scaling, itching, weeping, crusting, and pain.
Tranexamic Acid Counseling:  Patient advised of the small risk of bleeding problems with tranexamic acid. They were also instructed to call if they developed any nausea, vomiting or diarrhea. All of the patient's questions and concerns were addressed.
Niacinamide Counseling: I recommended taking niacin or niacinamide, also know as vitamin B3, twice daily. Recent evidence suggests that taking vitamin B3 (500 mg twice daily) can reduce the risk of actinic keratoses and non-melanoma skin cancers. Side effects of vitamin B3 include flushing and headache.
Protopic Pregnancy And Lactation Text: This medication is Pregnancy Category C. It is unknown if this medication is excreted in breast milk when applied topically.
Rinvoq Pregnancy And Lactation Text: Based on animal studies, Rinvoq may cause embryo-fetal harm when administered to pregnant women.  The medication should not be used in pregnancy.  Breastfeeding is not recommended during treatment and for 6 days after the last dose.
Minoxidil Counseling: Minoxidil is a topical medication which can increase blood flow where it is applied. It is uncertain how this medication increases hair growth. Side effects are uncommon and include stinging and allergic reactions.
Dapsone Pregnancy And Lactation Text: This medication is Pregnancy Category C and is not considered safe during pregnancy or breast feeding.
Topical Retinoid counseling:  Patient advised to apply a pea-sized amount only at bedtime and wait 30 minutes after washing their face before applying.  If too drying, patient may add a non-comedogenic moisturizer. The patient verbalized understanding of the proper use and possible adverse effects of retinoids.  All of the patient's questions and concerns were addressed.
Detail Level: Simple
Simponi Counseling:  I discussed with the patient the risks of golimumab including but not limited to myelosuppression, immunosuppression, autoimmune hepatitis, demyelinating diseases, lymphoma, and serious infections.  The patient understands that monitoring is required including a PPD at baseline and must alert us or the primary physician if symptoms of infection or other concerning signs are noted.
Isotretinoin Counseling: Patient should get monthly blood tests, not donate blood, not drive at night if vision affected, not share medication, and not undergo elective surgery for 6 months after tx completed. Side effects reviewed, pt to contact office should one occur.
Cosentyx Counseling:  I discussed with the patient the risks of Cosentyx including but not limited to worsening of Crohn's disease, immunosuppression, allergic reactions and infections.  The patient understands that monitoring is required including a PPD at baseline and must alert us or the primary physician if symptoms of infection or other concerning signs are noted.
Doxycycline Counseling:  Patient counseled regarding possible photosensitivity and increased risk for sunburn.  Patient instructed to avoid sunlight, if possible.  When exposed to sunlight, patients should wear protective clothing, sunglasses, and sunscreen.  The patient was instructed to call the office immediately if the following severe adverse effects occur:  hearing changes, easy bruising/bleeding, severe headache, or vision changes.  The patient verbalized understanding of the proper use and possible adverse effects of doxycycline.  All of the patient's questions and concerns were addressed.
Opioid Pregnancy And Lactation Text: These medications can lead to premature delivery and should be avoided during pregnancy. These medications are also present in breast milk in small amounts.
Azathioprine Counseling:  I discussed with the patient the risks of azathioprine including but not limited to myelosuppression, immunosuppression, hepatotoxicity, lymphoma, and infections.  The patient understands that monitoring is required including baseline LFTs, Creatinine, possible TPMP genotyping and weekly CBCs for the first month and then every 2 weeks thereafter.  The patient verbalized understanding of the proper use and possible adverse effects of azathioprine.  All of the patient's questions and concerns were addressed.
Methotrexate Pregnancy And Lactation Text: This medication is Pregnancy Category X and is known to cause fetal harm. This medication is excreted in breast milk.
Gabapentin Counseling: I discussed with the patient the risks of gabapentin including but not limited to dizziness, somnolence, fatigue and ataxia.
Terbinafine Counseling: Patient counseling regarding adverse effects of terbinafine including but not limited to headache, diarrhea, rash, upset stomach, liver function test abnormalities, itching, taste/smell disturbance, nausea, abdominal pain, and flatulence.  There is a rare possibility of liver failure that can occur when taking terbinafine.  The patient understands that a baseline LFT and kidney function test may be required. The patient verbalized understanding of the proper use and possible adverse effects of terbinafine.  All of the patient's questions and concerns were addressed.
Niacinamide Pregnancy And Lactation Text: These medications are considered safe during pregnancy.
Topical Steroids Counseling: I discussed with the patient that prolonged use of topical steroids can result in the increased appearance of superficial blood vessels (telangiectasias), lightening (hypopigmentation) and thinning of the skin (atrophy).  Patient understands to avoid using high potency steroids in skin folds, the groin or the face.  The patient verbalized understanding of the proper use and possible adverse effects of topical steroids.  All of the patient's questions and concerns were addressed.
Oxybutynin Counseling:  I discussed with the patient the risks of oxybutynin including but not limited to skin rash, drowsiness, dry mouth, difficulty urinating, and blurred vision.
Vtama Pregnancy And Lactation Text: It is unknown if this medication can cause problems during pregnancy and breastfeeding.
Albendazole Counseling:  I discussed with the patient the risks of albendazole including but not limited to cytopenia, kidney damage, nausea/vomiting and severe allergy.  The patient understands that this medication is being used in an off-label manner.
Tranexamic Acid Pregnancy And Lactation Text: It is unknown if this medication is safe during pregnancy or breast feeding.
Infliximab Counseling:  I discussed with the patient the risks of infliximab including but not limited to myelosuppression, immunosuppression, autoimmune hepatitis, demyelinating diseases, lymphoma, and serious infections.  The patient understands that monitoring is required including a PPD at baseline and must alert us or the primary physician if symptoms of infection or other concerning signs are noted.
Qbrexza Counseling:  I discussed with the patient the risks of Qbrexza including but not limited to headache, mydriasis, blurred vision, dry eyes, nasal dryness, dry mouth, dry throat, dry skin, urinary hesitation, and constipation.  Local skin reactions including erythema, burning, stinging, and itching can also occur.
Minoxidil Pregnancy And Lactation Text: This medication has not been assigned a Pregnancy Risk Category but animal studies failed to show danger with the topical medication. It is unknown if the medication is excreted in breast milk.
Sotyktu Counseling:  I discussed the most common side effects of Sotyktu including: common cold, sore throat, sinus infections, cold sores, canker sores, folliculitis, and acne.  I also discussed more serious side effects of Sotyktu including but not limited to: serious allergic reactions; increased risk for infections such as TB; cancers such as lymphomas; rhabdomyolysis and elevated CPK; and elevated triglycerides and liver enzymes. 
Eucrisa Counseling: Patient may experience a mild burning sensation during topical application. Eucrisa is not approved in children less than 3 months of age.
Dutasteride Male Counseling: Dustasteride Counseling:  I discussed with the patient the risks of use of dutasteride including but not limited to decreased libido, decreased ejaculate volume, and gynecomastia. Women who can become pregnant should not handle medication.  All of the patient's questions and concerns were addressed.
Xolair Counseling:  Patient informed of potential adverse effects including but not limited to fever, muscle aches, rash and allergic reactions.  The patient verbalized understanding of the proper use and possible adverse effects of Xolair.  All of the patient's questions and concerns were addressed.
Isotretinoin Pregnancy And Lactation Text: This medication is Pregnancy Category X and is considered extremely dangerous during pregnancy. It is unknown if it is excreted in breast milk.
Zoryve Counseling:  I discussed with the patient that Zoryve is not for use in the eyes, mouth or vagina. The most commonly reported side effects include diarrhea, headache, insomnia, application site pain, upper respiratory tract infections, and urinary tract infections.  All of the patient's questions and concerns were addressed.
Doxycycline Pregnancy And Lactation Text: This medication is Pregnancy Category D and not consider safe during pregnancy. It is also excreted in breast milk but is considered safe for shorter treatment courses.
Valtrex Counseling: I discussed with the patient the risks of valacyclovir including but not limited to kidney damage, nausea, vomiting and severe allergy.  The patient understands that if the infection seems to be worsening or is not improving, they are to call.
Albendazole Pregnancy And Lactation Text: This medication is Pregnancy Category C and it isn't known if it is safe during pregnancy. It is also excreted in breast milk.
Include Pregnancy/Lactation Warning?: No
Azathioprine Pregnancy And Lactation Text: This medication is Pregnancy Category D and isn't considered safe during pregnancy. It is unknown if this medication is excreted in breast milk.
Rifampin Counseling: I discussed with the patient the risks of rifampin including but not limited to liver damage, kidney damage, red-orange body fluids, nausea/vomiting and severe allergy.
Terbinafine Pregnancy And Lactation Text: This medication is Pregnancy Category B and is considered safe during pregnancy. It is also excreted in breast milk and breast feeding isn't recommended.
Prednisone Counseling:  I discussed with the patient the risks of prolonged use of prednisone including but not limited to weight gain, insomnia, osteoporosis, mood changes, diabetes, susceptibility to infection, glaucoma and high blood pressure.  In cases where prednisone use is prolonged, patients should be monitored with blood pressure checks, serum glucose levels and an eye exam.  Additionally, the patient may need to be placed on GI prophylaxis, PCP prophylaxis, and calcium and vitamin D supplementation and/or a bisphosphonate.  The patient verbalized understanding of the proper use and the possible adverse effects of prednisone.  All of the patient's questions and concerns were addressed.
Arava Counseling:  Patient counseled regarding adverse effects of Arava including but not limited to nausea, vomiting, abnormalities in liver function tests. Patients may develop mouth sores, rash, diarrhea, and abnormalities in blood counts. The patient understands that monitoring is required including LFTs and blood counts.  There is a rare possibility of scarring of the liver and lung problems that can occur when taking methotrexate. Persistent nausea, loss of appetite, pale stools, dark urine, cough, and shortness of breath should be reported immediately. Patient advised to discontinue Arava treatment and consult with a physician prior to attempting conception. The patient will have to undergo a treatment to eliminate Arava from the body prior to conception.
Fluconazole Counseling:  Patient counseled regarding adverse effects of fluconazole including but not limited to headache, diarrhea, nausea, upset stomach, liver function test abnormalities, taste disturbance, and stomach pain.  There is a rare possibility of liver failure that can occur when taking fluconazole.  The patient understands that monitoring of LFTs and kidney function test may be required, especially at baseline. The patient verbalized understanding of the proper use and possible adverse effects of fluconazole.  All of the patient's questions and concerns were addressed.
Nsaids Counseling: NSAID Counseling: I discussed with the patient that NSAIDs should be taken with food. Prolonged use of NSAIDs can result in the development of stomach ulcers.  Patient advised to stop taking NSAIDs if abdominal pain occurs.  The patient verbalized understanding of the proper use and possible adverse effects of NSAIDs.  All of the patient's questions and concerns were addressed.
Tazorac Counseling:  Patient advised that medication is irritating and drying.  Patient may need to apply sparingly and wash off after an hour before eventually leaving it on overnight.  The patient verbalized understanding of the proper use and possible adverse effects of tazorac.  All of the patient's questions and concerns were addressed.
Calcipotriene Counseling:  I discussed with the patient the risks of calcipotriene including but not limited to erythema, scaling, itching, and irritation.
Topical Steroids Applications Pregnancy And Lactation Text: Most topical steroids are considered safe to use during pregnancy and lactation.  Any topical steroid applied to the breast or nipple should be washed off before breastfeeding.
Qbrexza Pregnancy And Lactation Text: There is no available data on Qbrexza use in pregnant women.  There is no available data on Qbrexza use in lactation.
Sotyktu Pregnancy And Lactation Text: There is insufficient data to evaluate whether or not Sotyktu is safe to use during pregnancy.   It is not known if Sotyktu passes into breast milk and whether or not it is safe to use when breastfeeding.  
Dupixent Counseling: I discussed with the patient the risks of dupilumab including but not limited to eye inflammation and irritation, cold sores, injection site reactions, allergic reactions and increased risk of parasitic infection. The patient understands that monitoring is required and they must alert us or the primary physician if symptoms of infection or other concerning signs are noted.
Mirvaso Counseling: Mirvaso is a topical medication which can decrease superficial blood flow where applied. Side effects are uncommon and include stinging, redness and allergic reactions.
Skyrizi Counseling: I discussed with the patient the risks of risankizumab-rzaa including but not limited to immunosuppression, and serious infections.  The patient understands that monitoring is required including a PPD at baseline and must alert us or the primary physician if symptoms of infection or other concerning signs are noted.
Xolair Pregnancy And Lactation Text: This medication is Pregnancy Category B and is considered safe during pregnancy. This medication is excreted in breast milk.
Dutasteride Pregnancy And Lactation Text: This medication is absolutely contraindicated in women, especially during pregnancy and breast feeding. Feminization of male fetuses is possible if taking while pregnant.
Cellcept Counseling:  I discussed with the patient the risks of mycophenolate mofetil including but not limited to infection/immunosuppression, GI upset, hypokalemia, hypercholesterolemia, bone marrow suppression, lymphoproliferative disorders, malignancy, GI ulceration/bleed/perforation, colitis, interstitial lung disease, kidney failure, progressive multifocal leukoencephalopathy, and birth defects.  The patient understands that monitoring is required including a baseline creatinine and regular CBC testing. In addition, patient must alert us immediately if symptoms of infection or other concerning signs are noted.
Valtrex Pregnancy And Lactation Text: this medication is Pregnancy Category B and is considered safe during pregnancy. This medication is not directly found in breast milk but it's metabolite acyclovir is present.
Rifampin Pregnancy And Lactation Text: This medication is Pregnancy Category C and it isn't know if it is safe during pregnancy. It is also excreted in breast milk and should not be used if you are breast feeding.
Calcipotriene Pregnancy And Lactation Text: The use of this medication during pregnancy or lactation is not recommended as there is insufficient data.
Erythromycin Counseling:  I discussed with the patient the risks of erythromycin including but not limited to GI upset, allergic reaction, drug rash, diarrhea, increase in liver enzymes, and yeast infections.
High Dose Vitamin A Counseling: Side effects reviewed, pt to contact office should one occur.
Xeljanz Counseling: I discussed with the patient the risks of Xeljanz therapy including increased risk of infection, liver issues, headache, diarrhea, or cold symptoms. Live vaccines should be avoided. They were instructed to call if they have any problems.
Topical Sulfur Applications Counseling: Topical Sulfur Counseling: Patient counseled that this medication may cause skin irritation or allergic reactions.  In the event of skin irritation, the patient was advised to reduce the amount of the drug applied or use it less frequently.   The patient verbalized understanding of the proper use and possible adverse effects of topical sulfur application.  All of the patient's questions and concerns were addressed.
Propranolol Counseling:  I discussed with the patient the risks of propranolol including but not limited to low heart rate, low blood pressure, low blood sugar, restlessness and increased cold sensitivity. They should call the office if they experience any of these side effects.
Rhofade Counseling: Rhofade is a topical medication which can decrease superficial blood flow where applied. Side effects are uncommon and include stinging, redness and allergic reactions.
Ivermectin Counseling:  Patient instructed to take medication on an empty stomach with a full glass of water.  Patient informed of potential adverse effects including but not limited to nausea, diarrhea, dizziness, itching, and swelling of the extremities or lymph nodes.  The patient verbalized understanding of the proper use and possible adverse effects of ivermectin.  All of the patient's questions and concerns were addressed.
Nsaids Pregnancy And Lactation Text: These medications are considered safe up to 30 weeks gestation. It is excreted in breast milk.
Hydroquinone Counseling:  Patient advised that medication may result in skin irritation, lightening (hypopigmentation), dryness, and burning.  In the event of skin irritation, the patient was advised to reduce the amount of the drug applied or use it less frequently.  Rarely, spots that are treated with hydroquinone can become darker (pseudoochronosis).  Should this occur, patient instructed to stop medication and call the office. The patient verbalized understanding of the proper use and possible adverse effects of hydroquinone.  All of the patient's questions and concerns were addressed.
Dupixent Pregnancy And Lactation Text: This medication likely crosses the placenta but the risk for the fetus is uncertain. This medication is excreted in breast milk.
Finasteride Male Counseling: Finasteride Counseling:  I discussed with the patient the risks of use of finasteride including but not limited to decreased libido, decreased ejaculate volume, gynecomastia, and depression. Women should not handle medication.  All of the patient's questions and concerns were addressed.
Tazorac Pregnancy And Lactation Text: This medication is not safe during pregnancy. It is unknown if this medication is excreted in breast milk.
Rituxan Counseling:  I discussed with the patient the risks of Rituxan infusions. Side effects can include infusion reactions, severe drug rashes including mucocutaneous reactions, reactivation of latent hepatitis and other infections and rarely progressive multifocal leukoencephalopathy.  All of the patient's questions and concerns were addressed.
Glycopyrrolate Counseling:  I discussed with the patient the risks of glycopyrrolate including but not limited to skin rash, drowsiness, dry mouth, difficulty urinating, and blurred vision.
Mirvaso Pregnancy And Lactation Text: This medication has not been assigned a Pregnancy Risk Category. It is unknown if the medication is excreted in breast milk.
Cimetidine Counseling:  I discussed with the patient the risks of Cimetidine including but not limited to gynecomastia, headache, diarrhea, nausea, drowsiness, arrhythmias, pancreatitis, skin rashes, psychosis, bone marrow suppression and kidney toxicity.
High Dose Vitamin A Pregnancy And Lactation Text: High dose vitamin A therapy is contraindicated during pregnancy and breast feeding.
Cantharidin Counseling:  I discussed with the patient the risks of Cantharidin including but not limited to pain, redness, burning, itching, and blistering.
Bactrim Counseling:  I discussed with the patient the risks of sulfa antibiotics including but not limited to GI upset, allergic reaction, drug rash, diarrhea, dizziness, photosensitivity, and yeast infections.  Rarely, more serious reactions can occur including but not limited to aplastic anemia, agranulocytosis, methemoglobinemia, blood dyscrasias, liver or kidney failure, lung infiltrates or desquamative/blistering drug rashes.
Propranolol Pregnancy And Lactation Text: This medication is Pregnancy Category C and it isn't known if it is safe during pregnancy. It is excreted in breast milk.
Topical Sulfur Applications Pregnancy And Lactation Text: This medication is considered safe during pregnancy and breast feeding secondary to limited systemic absorption.
Erythromycin Pregnancy And Lactation Text: This medication is Pregnancy Category B and is considered safe during pregnancy. It is also excreted in breast milk.
Zyclara Counseling:  I discussed with the patient the risks of imiquimod including but not limited to erythema, scaling, itching, weeping, crusting, and pain.  Patient understands that the inflammatory response to imiquimod is variable from person to person and was educated regarded proper titration schedule.  If flu-like symptoms develop, patient knows to discontinue the medication and contact us.
Sarecycline Counseling: Patient advised regarding possible photosensitivity and discoloration of the teeth, skin, lips, tongue and gums.  Patient instructed to avoid sunlight, if possible.  When exposed to sunlight, patients should wear protective clothing, sunglasses, and sunscreen.  The patient was instructed to call the office immediately if the following severe adverse effects occur:  hearing changes, easy bruising/bleeding, severe headache, or vision changes.  The patient verbalized understanding of the proper use and possible adverse effects of sarecycline.  All of the patient's questions and concerns were addressed.
Azithromycin Counseling:  I discussed with the patient the risks of azithromycin including but not limited to GI upset, allergic reaction, drug rash, diarrhea, and yeast infections.
Cantharidin Pregnancy And Lactation Text: This medication has not been proven safe during pregnancy. It is unknown if this medication is excreted in breast milk.
Cibinqo Counseling: I discussed with the patient the risks of Cibinqo therapy including but not limited to common cold, nausea, headache, cold sores, increased blood CPK levels, dizziness, UTIs, fatigue, acne, and vomitting. Live vaccines should be avoided.  This medication has been linked to serious infections; higher rate of mortality; malignancy and lymphoproliferative disorders; major adverse cardiovascular events; thrombosis; thrombocytopenia and lymphopenia; lipid elevations; and retinal detachment.
Olanzapine Counseling- I discussed with the patient the common side effects of olanzapine including but are not limited to: lack of energy, dry mouth, increased appetite, sleepiness, tremor, constipation, dizziness, changes in behavior, or restlessness.  Explained that teenagers are more likely to experience headaches, abdominal pain, pain in the arms or legs, tiredness, and sleepiness.  Serious side effects include but are not limited: increased risk of death in elderly patients who are confused, have memory loss, or dementia-related psychosis; hyperglycemia; increased cholesterol and triglycerides; and weight gain.
Xelkikiz Pregnancy And Lactation Text: This medication is Pregnancy Category D and is not considered safe during pregnancy.  The risk during breast feeding is also uncertain.
Erivedge Counseling- I discussed with the patient the risks of Erivedge including but not limited to nausea, vomiting, diarrhea, constipation, weight loss, changes in the sense of taste, decreased appetite, muscle spasms, and hair loss.  The patient verbalized understanding of the proper use and possible adverse effects of Erivedge.  All of the patient's questions and concerns were addressed.
Opzelura Counseling:  I discussed with the patient the risks of Opzelura including but not limited to nasopharngitis, bronchitis, ear infection, eosinophila, hives, diarrhea, folliculitis, tonsillitis, and rhinorrhea.  Taken orally, this medication has been linked to serious infections; higher rate of mortality; malignancy and lymphoproliferative disorders; major adverse cardiovascular events; thrombosis; thrombocytopenia, anemia, and neutropenia; and lipid elevations.
Glycopyrrolate Pregnancy And Lactation Text: This medication is Pregnancy Category B and is considered safe during pregnancy. It is unknown if it is excreted breast milk.
Griseofulvin Counseling:  I discussed with the patient the risks of griseofulvin including but not limited to photosensitivity, cytopenia, liver damage, nausea/vomiting and severe allergy.  The patient understands that this medication is best absorbed when taken with a fatty meal (e.g., ice cream or french fries).
Topical Clindamycin Counseling: Patient counseled that this medication may cause skin irritation or allergic reactions.  In the event of skin irritation, the patient was advised to reduce the amount of the drug applied or use it less frequently.   The patient verbalized understanding of the proper use and possible adverse effects of clindamycin.  All of the patient's questions and concerns were addressed.
Stelara Counseling:  I discussed with the patient the risks of ustekinumab including but not limited to immunosuppression, malignancy, posterior leukoencephalopathy syndrome, and serious infections.  The patient understands that monitoring is required including a PPD at baseline and must alert us or the primary physician if symptoms of infection or other concerning signs are noted.
5-Fu Counseling: 5-Fluorouracil Counseling:  I discussed with the patient the risks of 5-fluorouracil including but not limited to erythema, scaling, itching, weeping, crusting, and pain.
Clofazimine Counseling:  I discussed with the patient the risks of clofazimine including but not limited to skin and eye pigmentation, liver damage, nausea/vomiting, gastrointestinal bleeding and allergy.
Enbrel Counseling:  I discussed with the patient the risks of etanercept including but not limited to myelosuppression, immunosuppression, autoimmune hepatitis, demyelinating diseases, lymphoma, and infections.  The patient understands that monitoring is required including a PPD at baseline and must alert us or the primary physician if symptoms of infection or other concerning signs are noted.
Finasteride Pregnancy And Lactation Text: This medication is absolutely contraindicated during pregnancy. It is unknown if it is excreted in breast milk.
Rituxan Pregnancy And Lactation Text: This medication is Pregnancy Category C and it isn't know if it is safe during pregnancy. It is unknown if this medication is excreted in breast milk but similar antibodies are known to be excreted.
Azithromycin Pregnancy And Lactation Text: This medication is considered safe during pregnancy and is also secreted in breast milk.
Cyclophosphamide Counseling:  I discussed with the patient the risks of cyclophosphamide including but not limited to hair loss, hormonal abnormalities, decreased fertility, abdominal pain, diarrhea, nausea and vomiting, bone marrow suppression and infection. The patient understands that monitoring is required while taking this medication.
Bactrim Pregnancy And Lactation Text: This medication is Pregnancy Category D and is known to cause fetal risk.  It is also excreted in breast milk.
Cibinqo Pregnancy And Lactation Text: It is unknown if this medication will adversely affect pregnancy or breast feeding.  You should not take this medication if you are currently pregnant or planning a pregnancy or while breastfeeding.
Wartpeel Counseling:  I discussed with the patient the risks of Wartpeel including but not limited to erythema, scaling, itching, weeping, crusting, and pain.
Metronidazole Counseling:  I discussed with the patient the risks of metronidazole including but not limited to seizures, nausea/vomiting, a metallic taste in the mouth, nausea/vomiting and severe allergy.
Olanzapine Pregnancy And Lactation Text: This medication is pregnancy category C.   There are no adequate and well controlled trials with olanzapine in pregnant females.  Olanzapine should be used during pregnancy only if the potential benefit justifies the potential risk to the fetus.   In a study in lactating healthy women, olanzapine was excreted in breast milk.  It is recommended that women taking olanzapine should not breast feed.
SSKI Counseling:  I discussed with the patient the risks of SSKI including but not limited to thyroid abnormalities, metallic taste, GI upset, fever, headache, acne, arthralgias, paraesthesias, lymphadenopathy, easy bleeding, arrhythmias, and allergic reaction.
Griseofulvin Pregnancy And Lactation Text: This medication is Pregnancy Category X and is known to cause serious birth defects. It is unknown if this medication is excreted in breast milk but breast feeding should be avoided.

## 2023-09-19 ENCOUNTER — APPOINTMENT (RX ONLY)
Dept: URBAN - METROPOLITAN AREA CLINIC 15 | Facility: CLINIC | Age: 88
Setting detail: DERMATOLOGY
End: 2023-09-19

## 2023-09-19 DIAGNOSIS — D17 BENIGN LIPOMATOUS NEOPLASM: ICD-10-CM

## 2023-09-19 PROBLEM — D17.1 BENIGN LIPOMATOUS NEOPLASM OF SKIN AND SUBCUTANEOUS TISSUE OF TRUNK: Status: ACTIVE | Noted: 2023-09-19

## 2023-09-19 PROCEDURE — 12032 INTMD RPR S/A/T/EXT 2.6-7.5: CPT

## 2023-09-19 PROCEDURE — 11406 EXC TR-EXT B9+MARG >4.0 CM: CPT

## 2023-09-19 PROCEDURE — ? EXCISION

## 2023-09-19 ASSESSMENT — LOCATION ZONE DERM: LOCATION ZONE: TRUNK

## 2023-09-19 ASSESSMENT — LOCATION DETAILED DESCRIPTION DERM: LOCATION DETAILED: RIGHT SUPERIOR UPPER BACK

## 2023-09-19 ASSESSMENT — LOCATION SIMPLE DESCRIPTION DERM: LOCATION SIMPLE: RIGHT UPPER BACK

## 2023-09-19 NOTE — PROCEDURE: EXCISION

## 2023-09-26 ENCOUNTER — APPOINTMENT (RX ONLY)
Dept: URBAN - METROPOLITAN AREA CLINIC 15 | Facility: CLINIC | Age: 88
Setting detail: DERMATOLOGY
End: 2023-09-26

## 2023-09-26 ENCOUNTER — RX ONLY (OUTPATIENT)
Age: 88
Setting detail: RX ONLY
End: 2023-09-26

## 2023-09-26 DIAGNOSIS — L60.3 NAIL DYSTROPHY: ICD-10-CM

## 2023-09-26 DIAGNOSIS — T1490XA CONTUSION OF UNSPECIFIED SITE: ICD-10-CM

## 2023-09-26 PROBLEM — S20.221A CONTUSION OF RIGHT BACK WALL OF THORAX, INITIAL ENCOUNTER: Status: ACTIVE | Noted: 2023-09-26

## 2023-09-26 PROCEDURE — ? ADDITIONAL NOTES

## 2023-09-26 PROCEDURE — ? COUNSELING

## 2023-09-26 PROCEDURE — ? PRESCRIPTION

## 2023-09-26 PROCEDURE — 10140 I&D HMTMA SEROMA/FLUID COLLJ: CPT

## 2023-09-26 RX ORDER — CEPHALEXIN 500 MG/1
CAPSULE ORAL
Qty: 28 | Refills: 0 | Status: ERX | COMMUNITY
Start: 2023-09-26

## 2023-09-26 RX ORDER — DOXYCYCLINE 100 MG/1
1 TABLET, FILM COATED ORAL BID
Qty: 28 | Refills: 0 | Status: CANCELLED

## 2023-09-26 RX ORDER — TRETIONIN 1 MG/G
1 CREAM TOPICAL QHS
Qty: 20 | Refills: 0 | Status: ERX | COMMUNITY
Start: 2023-09-26

## 2023-09-26 RX ADMIN — TRETIONIN 1: 1 CREAM TOPICAL at 00:00

## 2023-09-26 ASSESSMENT — LOCATION ZONE DERM
LOCATION ZONE: TRUNK
LOCATION ZONE: FINGERNAIL

## 2023-09-26 ASSESSMENT — LOCATION SIMPLE DESCRIPTION DERM
LOCATION SIMPLE: RIGHT THUMBNAIL
LOCATION SIMPLE: RIGHT UPPER BACK

## 2023-09-26 ASSESSMENT — LOCATION DETAILED DESCRIPTION DERM
LOCATION DETAILED: RIGHT THUMBNAIL
LOCATION DETAILED: RIGHT SUPERIOR UPPER BACK

## 2023-09-26 NOTE — HPI: SURGICAL COMPLICATION (HEMATOMA)
How Severe Is It?: severe
Is This A New Presentation, Or A Follow-Up?: Hematoma
When Was Your Surgical Procedure ?: 09/19/2023

## 2023-09-26 NOTE — PROCEDURE: ADDITIONAL NOTES
Detail Level: Simple
Render Risk Assessment In Note?: no
Additional Notes: This AM my MA received photo via email from patient's daughter that showed a large subQ swelling c/w hematoma under the site of the excision of a lipoma on the back, 1 week ago. We called the patient and asked her to come into the clinic immediately. Patient came in this AM. We had lengthy discussion about hematoma, need for aspiration and extraction of the hematoma. She reported to me that the swelling had been present since the day of the procedure; she didn't call immediately because she wasn't sure it wasn't normal. She did try to get in touch w/ us on Fri (last week) however she called my MAs direct line instead of the office # and we were not in the office on Friday. She denies f/c and actually states that it's not overly painful, just uncomfortable when pressure is applied to it. After risk/benefit discussion, she elected to proceed w/ evacuation of hematoma.\\n\\nOf note, I reviewed the case & photos with colleagues in general dermatology, mohs surgery, and general surgery to formulate plan for evacuation.
Additional Notes: The back was cleansed thoroughly with hibacleanse. The running epidermal sutures were removed with scissors and the wound edges and periphery of hematoma were anesthesized with 1% lidocaine with epinephrine, buffered w/ bicarbonate. The incision was opened and the hematoma was evacuated with gentle pressure and the cavity was flushed with normal saline. There was no significant active bleeding. A few small areas of bleeding were cauterized. The wound edges were curretted until pin point bleeding occurred so as to maximize healing w/ re-suturing. I placed one deep plication stitch overlying the muscle w/ 3-0 monocryl to attempt to close some of the dead space occupied by hematoma. I then used the same suture to throw buried vertical mattress sutures dermally. I used 4-0 prolene to place simple interrupted top sutures. A pressure dressing was applied and the patient was counseled on after care, need for compression, warning signs of reformation, infection. Patient was given my cell # and instructed to send me photo of area once daily until f/u in 3 days (friday). Start cephalexin 500 mg BID x 14 d to for infx ppx

## 2023-09-29 ENCOUNTER — APPOINTMENT (RX ONLY)
Dept: URBAN - METROPOLITAN AREA CLINIC 15 | Facility: CLINIC | Age: 88
Setting detail: DERMATOLOGY
End: 2023-09-29

## 2023-09-29 ENCOUNTER — RX ONLY (OUTPATIENT)
Age: 88
Setting detail: RX ONLY
End: 2023-09-29

## 2023-09-29 DIAGNOSIS — T1490XA CONTUSION OF UNSPECIFIED SITE: ICD-10-CM

## 2023-09-29 PROBLEM — S20.221A CONTUSION OF RIGHT BACK WALL OF THORAX, INITIAL ENCOUNTER: Status: ACTIVE | Noted: 2023-09-29

## 2023-09-29 PROCEDURE — ? ADDITIONAL NOTES

## 2023-09-29 PROCEDURE — 99212 OFFICE O/P EST SF 10 MIN: CPT | Mod: 24

## 2023-09-29 PROCEDURE — ? REFERRAL

## 2023-09-29 PROCEDURE — ? COUNSELING

## 2023-09-29 RX ORDER — CEPHALEXIN 500 MG/1
CAPSULE ORAL
Qty: 28 | Refills: 0 | Status: ERX | COMMUNITY
Start: 2023-09-29

## 2023-09-29 ASSESSMENT — LOCATION DETAILED DESCRIPTION DERM: LOCATION DETAILED: RIGHT SUPERIOR UPPER BACK

## 2023-09-29 ASSESSMENT — LOCATION ZONE DERM: LOCATION ZONE: TRUNK

## 2023-09-29 ASSESSMENT — LOCATION SIMPLE DESCRIPTION DERM: LOCATION SIMPLE: RIGHT UPPER BACK

## 2023-09-29 NOTE — PROCEDURE: ADDITIONAL NOTES
Render Risk Assessment In Note?: no
Additional Notes: partial re-accumulation of hematoma today. I numbed an area at the superior aspect and inserted 18g needle attached to 10 cc syringe ; drained about 6-8 cc of blood today. discussed case w/ shreyas shrestha (gen surg) . Will place referral for her to eval & see if drain is needed. she did nto  abx so we reiterated need for this & resent rx.
Detail Level: Simple

## 2023-10-02 ENCOUNTER — APPOINTMENT (RX ONLY)
Dept: URBAN - METROPOLITAN AREA CLINIC 15 | Facility: CLINIC | Age: 88
Setting detail: DERMATOLOGY
End: 2023-10-02

## 2023-10-02 ENCOUNTER — HOME HEALTH ADMISSION (OUTPATIENT)
Dept: HOME HEALTH SERVICES | Facility: HOME HEALTHCARE | Age: 88
End: 2023-10-02
Payer: MEDICARE

## 2023-10-02 DIAGNOSIS — T1490XA CONTUSION OF UNSPECIFIED SITE: ICD-10-CM

## 2023-10-02 PROBLEM — S20.221A CONTUSION OF RIGHT BACK WALL OF THORAX, INITIAL ENCOUNTER: Status: ACTIVE | Noted: 2023-10-02

## 2023-10-02 PROCEDURE — ? ADDITIONAL NOTES

## 2023-10-02 PROCEDURE — ? COUNSELING

## 2023-10-02 PROCEDURE — 99213 OFFICE O/P EST LOW 20 MIN: CPT | Mod: 24

## 2023-10-02 PROCEDURE — ? REFERRAL

## 2023-10-02 ASSESSMENT — LOCATION ZONE DERM: LOCATION ZONE: TRUNK

## 2023-10-02 ASSESSMENT — LOCATION DETAILED DESCRIPTION DERM: LOCATION DETAILED: RIGHT SUPERIOR UPPER BACK

## 2023-10-02 ASSESSMENT — LOCATION SIMPLE DESCRIPTION DERM: LOCATION SIMPLE: RIGHT UPPER BACK

## 2023-10-02 NOTE — PROCEDURE: ADDITIONAL NOTES
Render Risk Assessment In Note?: no
Detail Level: Simple
Additional Notes: Patient was seen for a dressing change. Has not been changed since Fri.  will see if we can get wound care nurse to come out and help her. today w/ mild fluctuance but not significant; will monitor & wait on further aspiration. i'm not sure she will need drain w/ gen surg but i'd still love for her ot see them. will see her again on fri. cont abx.

## 2023-10-04 ENCOUNTER — HOME CARE VISIT (OUTPATIENT)
Dept: HOME HEALTH SERVICES | Facility: HOME HEALTHCARE | Age: 88
End: 2023-10-04

## 2023-10-06 ENCOUNTER — APPOINTMENT (RX ONLY)
Dept: URBAN - METROPOLITAN AREA CLINIC 15 | Facility: CLINIC | Age: 88
Setting detail: DERMATOLOGY
End: 2023-10-06

## 2023-10-06 DIAGNOSIS — T1490XA CONTUSION OF UNSPECIFIED SITE: ICD-10-CM

## 2023-10-06 PROBLEM — S20.221A CONTUSION OF RIGHT BACK WALL OF THORAX, INITIAL ENCOUNTER: Status: ACTIVE | Noted: 2023-10-06

## 2023-10-06 PROCEDURE — ? ADDITIONAL NOTES

## 2023-10-06 PROCEDURE — ? COUNSELING

## 2023-10-06 PROCEDURE — 99212 OFFICE O/P EST SF 10 MIN: CPT | Mod: 24

## 2023-10-06 ASSESSMENT — LOCATION SIMPLE DESCRIPTION DERM: LOCATION SIMPLE: RIGHT UPPER BACK

## 2023-10-06 ASSESSMENT — LOCATION DETAILED DESCRIPTION DERM: LOCATION DETAILED: RIGHT SUPERIOR UPPER BACK

## 2023-10-06 ASSESSMENT — LOCATION ZONE DERM: LOCATION ZONE: TRUNK

## 2023-10-06 NOTE — PROCEDURE: ADDITIONAL NOTES
Render Risk Assessment In Note?: no
Detail Level: Simple
Additional Notes: Patient was seen for a dressing change. She did have a nurse come out from home health for a dressing change, patient is still taking antibiotics and will continue. She has improved last visit, and will plan to take sutures out next Wednesday. She will cancel her appointment with general surgery.\\n\\nmuch improved today!

## 2023-10-11 ENCOUNTER — APPOINTMENT (RX ONLY)
Dept: URBAN - METROPOLITAN AREA CLINIC 15 | Facility: CLINIC | Age: 88
Setting detail: DERMATOLOGY
End: 2023-10-11

## 2023-10-11 DIAGNOSIS — Z48.02 ENCOUNTER FOR REMOVAL OF SUTURES: ICD-10-CM

## 2023-10-11 PROCEDURE — ? SUTURE REMOVAL (GLOBAL PERIOD)

## 2023-10-11 ASSESSMENT — LOCATION ZONE DERM: LOCATION ZONE: TRUNK

## 2023-10-11 ASSESSMENT — LOCATION DETAILED DESCRIPTION DERM: LOCATION DETAILED: RIGHT SUPERIOR UPPER BACK

## 2023-10-11 ASSESSMENT — LOCATION SIMPLE DESCRIPTION DERM: LOCATION SIMPLE: RIGHT UPPER BACK

## 2023-10-11 NOTE — PROCEDURE: SUTURE REMOVAL (GLOBAL PERIOD)
Detail Level: Detailed
Add 79187 Cpt? (Important Note: In 2017 The Use Of 39532 Is Being Tracked By Cms To Determine Future Global Period Reimbursement For Global Periods): no

## 2023-10-23 ENCOUNTER — APPOINTMENT (RX ONLY)
Dept: URBAN - METROPOLITAN AREA CLINIC 15 | Facility: CLINIC | Age: 88
Setting detail: DERMATOLOGY
End: 2023-10-23

## 2023-10-23 DIAGNOSIS — L72.0 EPIDERMAL CYST: ICD-10-CM

## 2023-10-23 DIAGNOSIS — L90.5 SCAR CONDITIONS AND FIBROSIS OF SKIN: ICD-10-CM

## 2023-10-23 DIAGNOSIS — L60.3 NAIL DYSTROPHY: ICD-10-CM

## 2023-10-23 PROCEDURE — 99213 OFFICE O/P EST LOW 20 MIN: CPT

## 2023-10-23 PROCEDURE — ? COUNSELING

## 2023-10-23 PROCEDURE — ? PRESCRIPTION

## 2023-10-23 RX ORDER — TRETIONIN 0.25 MG/G
CREAM TOPICAL
Qty: 20 | Refills: 0 | Status: ERX | COMMUNITY
Start: 2023-10-23

## 2023-10-23 RX ADMIN — TRETIONIN 1: 0.25 CREAM TOPICAL at 00:00

## 2023-10-23 ASSESSMENT — LOCATION DETAILED DESCRIPTION DERM
LOCATION DETAILED: RIGHT THUMBNAIL
LOCATION DETAILED: LEFT INFERIOR CENTRAL MALAR CHEEK
LOCATION DETAILED: RIGHT SUPERIOR UPPER BACK
LOCATION DETAILED: LEFT INFERIOR MEDIAL MALAR CHEEK

## 2023-10-23 ASSESSMENT — LOCATION ZONE DERM
LOCATION ZONE: FACE
LOCATION ZONE: FINGERNAIL
LOCATION ZONE: TRUNK

## 2023-10-23 ASSESSMENT — LOCATION SIMPLE DESCRIPTION DERM
LOCATION SIMPLE: RIGHT THUMBNAIL
LOCATION SIMPLE: RIGHT UPPER BACK
LOCATION SIMPLE: LEFT CHEEK